# Patient Record
Sex: FEMALE | Race: WHITE | NOT HISPANIC OR LATINO | Employment: FULL TIME | URBAN - METROPOLITAN AREA
[De-identification: names, ages, dates, MRNs, and addresses within clinical notes are randomized per-mention and may not be internally consistent; named-entity substitution may affect disease eponyms.]

---

## 2020-11-19 ENCOUNTER — PATIENT OUTREACH (OUTPATIENT)
Dept: PLASTIC SURGERY | Facility: CLINIC | Age: 35
End: 2020-11-19

## 2020-11-19 NOTE — PROGRESS NOTES
[facility]:  Care Coordination Note     SITUATION   Patient (Cristian, they/them)  is a 34 year old who is receiving support for:  Consult For (top surgery ) and Clinic Care Coordination - Initial  .    BACKGROUND     New pt seeking top surgery.     Pt goes to [facility].     ASSESSMENT     Surgery              CGC Assessment  Comprehensive Gender Care ([facility]) Enrollment: Enrolled  Patient has a therapist: Yes  Name of therapist: Nick sanders [facility]  Letter of support #1: Received  Surgery being considered: Yes(5/2017 started hormones, goes to [facility])  Mastectomy: Yes    Pt reports he does not smoke or use nicotine and does not have diabetes.         PLAN          Nursing Interventions:   [facility] program and services discussed with patient. Educational surgical packet provided and reviewed with patient. Process for accessing surgery discussed, including: WPATH standards of care, letters of support, treatment plan action steps, [place] insurance process, surgery scheduling, and approximate timeline.   Pt questions answered within scope of practice.     Follow-up plan:    1. Sign up for mychart.   2. Obtain LOS and mammogram if possible before consult.        Yaya Davalos

## 2021-02-19 NOTE — TELEPHONE ENCOUNTER
FUTURE VISIT INFORMATION      FUTURE VISIT INFORMATION:    Date: 5.18.21    Time: 11 AM    Location: [facility]  REFERRAL INFORMATION:    Referring provider:  [facility]    Referring providers clinic:  [facility]    Reason for visit/diagnosis  New top    RECORDS REQUESTED FROM:

## 2021-05-06 ENCOUNTER — TRANSFERRED RECORDS (OUTPATIENT)
Dept: HEALTH INFORMATION MANAGEMENT | Facility: CLINIC | Age: 36
End: 2021-05-06

## 2021-05-10 ENCOUNTER — PATIENT OUTREACH (OUTPATIENT)
Dept: PLASTIC SURGERY | Facility: CLINIC | Age: 36
End: 2021-05-10

## 2021-05-10 NOTE — PROGRESS NOTES
[facility]:  Care Coordination Note     SITUATION   Patient is a 35 year old who is receiving support for:  Clinic Care Coordination - Follow-up (received top surgery letter)  .    BACKGROUND     Pt provided adequate top surgery letter prior to consultation date on 5/18/21 with DR. Willis.     ASSESSMENT     Surgery              CGC Assessment  Comprehensive Gender Care ([facility]) Enrollment: Enrolled  Patient has a therapist: Yes  Name of therapist: Nick Bean  Letter of support #1: Received  Letter #1 Date: 05/06/21  Surgery being considered: Yes  Mastectomy: Yes          PLAN          Nursing Interventions:   Reviewed letter of support for WPATH standards of care which is adequate.     Follow-up plan:  Pt to attend consult.        Yaya Davalos

## 2021-05-18 ENCOUNTER — OFFICE VISIT (OUTPATIENT)
Dept: PLASTIC SURGERY | Facility: CLINIC | Age: 36
End: 2021-05-18
Payer: COMMERCIAL

## 2021-05-18 ENCOUNTER — PRE VISIT (OUTPATIENT)
Dept: PLASTIC SURGERY | Facility: CLINIC | Age: 36
End: 2021-05-18

## 2021-05-18 VITALS — BODY MASS INDEX: 21.26 KG/M2 | WEIGHT: 120 LBS | HEIGHT: 63 IN

## 2021-05-18 DIAGNOSIS — F64.0 GENDER DYSPHORIA IN ADOLESCENT AND ADULT: Primary | ICD-10-CM

## 2021-05-18 PROCEDURE — 99204 OFFICE O/P NEW MOD 45 MIN: CPT | Performed by: SURGERY

## 2021-05-18 RX ORDER — CEFAZOLIN SODIUM 2 G/50ML
2 SOLUTION INTRAVENOUS
Status: CANCELLED | OUTPATIENT
Start: 2021-05-18

## 2021-05-18 RX ORDER — TESTOSTERONE CYPIONATE 1000 MG/10ML
60 INJECTION, SOLUTION INTRAMUSCULAR WEEKLY
COMMUNITY

## 2021-05-18 RX ORDER — HYDROXYZINE HYDROCHLORIDE 25 MG/1
25 TABLET, FILM COATED ORAL
COMMUNITY
Start: 2021-04-16

## 2021-05-18 RX ORDER — CEFAZOLIN SODIUM 2 G/50ML
2 SOLUTION INTRAVENOUS SEE ADMIN INSTRUCTIONS
Status: CANCELLED | OUTPATIENT
Start: 2021-05-18

## 2021-05-18 RX ORDER — FLUOXETINE 10 MG/1
10 CAPSULE ORAL
COMMUNITY
Start: 2021-04-16

## 2021-05-18 ASSESSMENT — PAIN SCALES - GENERAL: PAINLEVEL: NO PAIN (0)

## 2021-05-18 ASSESSMENT — MIFFLIN-ST. JEOR: SCORE: 1218.45

## 2021-05-18 NOTE — PROGRESS NOTES
"PLASTICS NEW [facility]   HPI: This is a 35 year old biological female who identifies as nonbinary/ \"masc of [place]\" with a history of gender dysphoria who presents today by themselves for a consultation for top surgery. Their pronouns are they/them and their preferred name is Cristian. They were referred by a friend who works at [employer] and made their appointment 9 months ago. Their therapist is Radha Bean (Zeb) and this therapist has already written the patient a letter of support. Patient has been seeing this therapist for the past 4 years. They have been on testosterone for 4 years, administered by [employer]. They have been living their chosen gender identity/role for 5-6 years. They do not usually bind but when they do they use a sports bra. No breast lumps, skin puckering, nipple drainage, or other breast problems. Patient had a cyst in their left breast in 2018 - benign. Resolved with antibiotics. He has had a mammogram in preparation for this appointment (Negative, 4/23/21). Left nipple used to be pierced - occasional discharge.     Medical Hx: Gender dysphoria. No history of asthma, diabetes mellitus, or GERD. No bleeding, clotting, healing or scarring problems.    Food sensitivities - undiagnosed.     Anxiety.     Depression - takes Prozac (prescribed by PCP Dr. Wagner) also does talk therapy.     Chronic pain - upper extremities, attributed to over use from work.     PTSD.     Surgical Hx:     Appendectomy - age 15. No complications with anesthesia. Experienced nausea from pain medication, possibly opioid sensitivity.     3rd molar extraction.    Family Hx: No known family history of ovarian cancer.  Unknown - some estrangement.  Mother had possible minimal breast CA? With minimal treatment.   Paternal side - hypertension.   MGF had MI - heavy smoker.   Father had prostate CA.     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  Relationship status/family: " "Single. Lives with dog. Lives alone. Friends will be able to take care of patient postop. Patient has experience taking care of others post top surgery. Smoking status: No nicotine. Occasional marijuana use. Alcohol use: Under 1 drink Qweek. Diet: Omnivore. No restrictions. Balanced diet. Caffeine: Rare coffee. Exercise: Occasionally goes for runs. Walks at least 1 hour Qday. Walks dog. Rides bike. Sleep: Averages 8 hours Qnight.     PE: General: Height: 5' 3\" Weight: 120 lbs   Chest:   Nice upper chest contour.  Tattoo below R lateral breast.  Small tattoo left anterior chest/shoulder.  Well-developed pec muscles.    Grade 1 nipple ptosis.   R breast more ptotic.  Chest hair present - possible need for post-op electrolysis for residual periareolar hair.   Areolae are about 2cm in diameter - prominent nipples.  R breast is slightly larger than the L. Both breasts about 100-150g.    IMFs situated about 2-3 cms below the pec muscle.   R IMF situated about 1cm lower than the left.   No lateral thoracic rolls or anterior axillary folds.   Fibrous breast tissue.   No lymphadenopathy or masses.   Photos taken with consent.     Patient initially wanted to maintain nipple sensation. However, we discussed the limitation to arielle-areolar/keyhole technique due to their low nipple placement. We talked through the best technique for the patient- trying to maintain blood flow yet also creating a flat appearance and proper nipple placement. Cristian will continue to reflect upon this but feels that they are comfortable with double incision technique with nipple grafts if necessary.    A&P: 35 year old biological female who identifies as nonbinary who is a good candidate for gender affirming top surgery with one of the following: bilateral simple mastectomy vs. breast reduction, +/- possible nipple graft reconstruction. They will most likely need a bilateral simple mastectomy with nipple graft reconstruction depending on intraoperative " findings and the patient's desired outcome. The patient is interested in nipple grafts. The patient has already had a pre-op mammogram. They will need an H&P from their PCP.     They did not meet with our Transgender Coordinator but they will be in contact via [employer] to discuss communications with staff and timeline. Any further discussion of risks and complications will be reviewed during the pre-op visit.    Because of patient's job, they would have to take at least 4-6 weeks off postop. They would like to schedule surgery some time in December while they are not working.     Patient accepts the risks of this procedure and would like to proceed with surgery.  Since we have already received their therapist letter of support we will initiate the prior authorization process.     According to [place] Case Law and [facility] standards of care, with an appropriate letter of support from a mental health provider, top surgery/mastectomy is medically necessary for the treatment of gender dysphoria.     Total time = 45 minutes, spent on the date of encounter doing chart review, history and physical, dressing changes, documentation, patient education, and any further activity as noted above.     This note was prepared on behalf of Kim Willis MD by Kimberly Watkins, a trained medical scribe, based on my observations and the provider's statements to me.

## 2021-05-18 NOTE — LETTER
"5/18/2021       RE: Yudelka Salas  [address] [address]     Dear Colleague,    Thank you for referring your patient, Yudelka Salas, to the [facility] PLASTIC AND RECONSTRUCTIVE SURGERY CLINIC [place] at [facility] [place]. Please see a copy of my visit note below.    PLASTICS NEW TOP   HPI: This is a 35 year old biological female who identifies as nonbinary/ \"masc of [place]\" with a history of gender dysphoria who presents today by themselves for a consultation for top surgery. Their pronouns are they/them and their preferred name is Cristian. They were referred by a friend who works at [employer] and made their appointment 9 months ago. Their therapist is Radha Bean ([facility]) and this therapist has already written the patient a letter of support. Patient has been seeing this therapist for the past 4 years. They have been on testosterone for 4 years, administered by [employer]. They have been living their chosen gender identity/role for 5-6 years. They do not usually bind but when they do they use a sports bra. No breast lumps, skin puckering, nipple drainage, or other breast problems. Patient had a cyst in their left breast in 2018 - benign. Resolved with antibiotics. He has had a mammogram in preparation for this appointment (Negative, 4/23/21). Left nipple used to be pierced - occasional discharge.     Medical Hx: Gender dysphoria. No history of asthma, diabetes mellitus, or GERD. No bleeding, clotting, healing or scarring problems.    Food sensitivities - undiagnosed.     Anxiety.     Depression - takes Prozac (prescribed by PCP Dr. Wagner) also does talk therapy.     Chronic pain - upper extremities, attributed to over use from work.     PTSD.     Surgical Hx:     Appendectomy - age 15. No complications with anesthesia. Experienced nausea from pain medication, possibly opioid sensitivity.     3rd molar extraction.    Family Hx: " "No known family history of ovarian cancer.  Unknown - some estrangement.  Mother had possible minimal breast CA? With minimal treatment.   Paternal side - hypertension.   MGF had MI - heavy smoker.   Father had prostate CA.     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  Relationship status/family: Single. Lives with dog. Lives alone. Friends will be able to take care of patient postop. Patient has experience taking care of others post top surgery. Smoking status: No nicotine. Occasional marijuana use. Alcohol use: Under 1 drink Qweek. Diet: Omnivore. No restrictions. Balanced diet. Caffeine: Rare coffee. Exercise: Occasionally goes for runs. Walks at least 1 hour Qday. Walks dog. Rides bike. Sleep: Averages 8 hours Qnight.     PE: General: Height: 5' 3\" Weight: 120 lbs   Chest:   Nice upper chest contour.  Tattoo below R lateral breast.  Small tattoo left anterior chest/shoulder.  Well-developed pec muscles.    Grade 1 nipple ptosis.   R breast more ptotic.  Chest hair present - possible need for post-op electrolysis for residual periareolar hair.   Areolae are about 2cm in diameter - prominent nipples.  R breast is slightly larger than the L. Both breasts about 100-150g.    IMFs situated about 2-3 cms below the pec muscle.   R IMF situated about 1cm lower than the left.   No lateral thoracic rolls or anterior axillary folds.   Fibrous breast tissue.   No lymphadenopathy or masses.   Photos taken with consent.     Patient initially wanted to maintain nipple sensation. However, we discussed the limitation to arielle-areolar/keyhole technique due to their low nipple placement. We talked through the best technique for the patient- trying to maintain blood flow yet also creating a flat appearance and proper nipple placement. Cristian will continue to reflect upon this but feels that they are comfortable with double incision technique with nipple grafts if necessary.    A&P: 35 year " old biological female who identifies as nonbinary who is a good candidate for gender affirming top surgery with one of the following: bilateral simple mastectomy vs. breast reduction, +/- possible nipple graft reconstruction. They will most likely need a bilateral simple mastectomy with nipple graft reconstruction depending on intraoperative findings and the patient's desired outcome. The patient is interested in nipple grafts. The patient has already had a pre-op mammogram. They will need an H&P from their PCP.     They did not meet with our Transgender Coordinator but they will be in contact via [employer] to discuss communications with staff and timeline. Any further discussion of risks and complications will be reviewed during the pre-op visit.    Because of patient's job, they would have to take at least 4-6 weeks off postop. They would like to schedule surgery some time in December while they are not working.     Patient accepts the risks of this procedure and would like to proceed with surgery.  Since we have already received their therapist letter of support we will initiate the prior authorization process.     According to [place] Case Law and [facility] standards of care, with an appropriate letter of support from a mental health provider, top surgery/mastectomy is medically necessary for the treatment of gender dysphoria.     Total time = 45 minutes, spent on the date of encounter doing chart review, history and physical, dressing changes, documentation, patient education, and any further activity as noted above.     This note was prepared on behalf of Kim Willis MD by Kimberly Watkins, a trained medical scribe, based on my observations and the provider's statements to me.         Again, thank you for allowing me to participate in the care of your patient.      Sincerely,    Kim Wlilis MD

## 2021-05-18 NOTE — LETTER
Date:July 29, 2021      Patient was self referred, no letter generated. Do not send.        [facility] Health Information

## 2021-05-18 NOTE — NURSING NOTE
"Chief Complaint   Patient presents with     Consult     Pt here for top surgery consult       Vitals:    05/18/21 1056   Weight: 54.4 kg (120 lb)   Height: 1.6 m (5' 3\")       Body mass index is 21.26 kg/m .      NELDA Mora NREMT                    No vitals taken per provier  "

## 2021-06-04 ENCOUNTER — TELEPHONE (OUTPATIENT)
Dept: SURGERY | Facility: CLINIC | Age: 36
End: 2021-06-04

## 2021-06-04 PROBLEM — F64.0 GENDER DYSPHORIA IN ADOLESCENT AND ADULT: Status: ACTIVE | Noted: 2021-06-04

## 2021-06-04 NOTE — TELEPHONE ENCOUNTER
I contacted the patient via phone and spoke with them to confirm the scheduled dates and provide the following information:     Surgeon/surgery date/location:  Dr. Willis on 12/1 at [place].  Arrival:   11:00 AM   Pre-op consult:   Dr. Willis on 11/9.   Pre-op physical with:   PCP. Patient is aware this needs to be completed within 30 days of surgery.  COVID-19 test:   11/29. Central scheduling to schedule.  Post-op:   12/7, 1/11/22.    The surgery packet was provided via letter in the mail per patient request.

## 2021-06-12 ENCOUNTER — HEALTH MAINTENANCE LETTER (OUTPATIENT)
Age: 36
End: 2021-06-12

## 2021-06-20 DIAGNOSIS — Z11.59 ENCOUNTER FOR SCREENING FOR OTHER VIRAL DISEASES: ICD-10-CM

## 2021-10-02 ENCOUNTER — HEALTH MAINTENANCE LETTER (OUTPATIENT)
Age: 36
End: 2021-10-02

## 2021-11-05 ENCOUNTER — TELEPHONE (OUTPATIENT)
Dept: SURGERY | Facility: CLINIC | Age: 36
End: 2021-11-05

## 2021-11-05 NOTE — TELEPHONE ENCOUNTER
[facility] Call Center    Phone Message    May a detailed message be left on voicemail: yes     Reason for Call: Other: Patient is calling in asking for a call back. They state that they would like to cancel and reschedule their upcoming surgery with Dr. Willis on 12/1. Please call back as soon as possible to discuss.     Action Taken: Message routed to:  [facility] Surg    Travel Screening: Not Applicable

## 2021-11-05 NOTE — TELEPHONE ENCOUNTER
Writer called pt to check about rescheduling surgery, no answer, LVM. Explained that pt would likely not be rescheduled until 3/2022. Writer to wait for a callback.     Aleisha Gary

## 2021-11-10 ENCOUNTER — TELEPHONE (OUTPATIENT)
Dept: SURGERY | Facility: CLINIC | Age: 36
End: 2021-11-10
Payer: COMMERCIAL

## 2021-11-10 NOTE — TELEPHONE ENCOUNTER
Patient wants to reschedule to June 2022. Called patient and left a voicemail to reschedule surgery with Dr. Willis from 12/1 to June. Pulled surgery from OR board and placed in depot. Cancelled appointments. Provided call back number to patient.

## 2022-05-04 ENCOUNTER — PATIENT OUTREACH (OUTPATIENT)
Dept: PLASTIC SURGERY | Facility: CLINIC | Age: 37
End: 2022-05-04
Payer: COMMERCIAL

## 2022-05-04 NOTE — PATIENT INSTRUCTIONS
Spoke with pt today to review options available to get questions answered about upcoming top surgery. After discussing pt questions with Dr Willis yesterday, I offered pt to come in and see her on 6/21 but pt was unavailable. Appt made for 6/28 to review option of Melanie approach vs DI approach. All other appts will remain the same in preparation for surgery on 7/13. Pt has many questions and I encouraged them to write them down in order to prepare for the visit. Pt is in agreement and grateful for the call.  Julian COLLIER RN

## 2022-06-28 ENCOUNTER — OFFICE VISIT (OUTPATIENT)
Dept: PLASTIC SURGERY | Facility: CLINIC | Age: 37
End: 2022-06-28
Payer: COMMERCIAL

## 2022-06-28 VITALS
DIASTOLIC BLOOD PRESSURE: 77 MMHG | HEART RATE: 71 BPM | HEIGHT: 63 IN | TEMPERATURE: 98.6 F | WEIGHT: 127 LBS | SYSTOLIC BLOOD PRESSURE: 114 MMHG | BODY MASS INDEX: 22.5 KG/M2 | OXYGEN SATURATION: 97 %

## 2022-06-28 DIAGNOSIS — F64.0 GENDER DYSPHORIA IN ADOLESCENT AND ADULT: Primary | ICD-10-CM

## 2022-06-28 PROCEDURE — 99214 OFFICE O/P EST MOD 30 MIN: CPT | Performed by: SURGERY

## 2022-06-28 ASSESSMENT — PAIN SCALES - GENERAL: PAINLEVEL: NO PAIN (0)

## 2022-06-28 NOTE — LETTER
Date:July 5, 2022      Provider requested that no letter be sent. Do not send.       [facility]

## 2022-06-28 NOTE — LETTER
6/28/2022       RE: Yudelka Salas  [address]  [place] [address]     Dear Colleague,    Thank you for referring your patient, Yudelka Salas, to the [facility] PLASTIC AND RECONSTRUCTIVE SURGERY CLINIC [place] at [facility]. Please see a copy of my visit note below.    PLASTICS PRE-OP  This is a 36 year old biological female who identifies as non-binary who presents for their pre-op visit prior to bilateral simple mastectomy with possible nipple graft reconstruction scheduled for 7/13/2022. They are here today by themself. The patient did not need a mammogram, and their letter of support from Nick Ahn has been received. History and physical were received. COVID test is scheduled for 7/09/2022.     Our LPN, Maria , discussed periop instructions with the patient including: not eating anything 8 hours prior to surgery, drinking clear liquids up to 2 hours before surgery except for morning medications with a sip of water, the preop shower with surgical soap which was given, and wearing a button- or zip-up shirt on the day of surgery. She also instructed the patient to avoid NSAIDs x 1 week both before AND after surgery, but they may take Tylenol post-op for pain as needed. She also gave the patient a folder with information on arielle-op topics, including where the surgery will be.     I discussed the following with the patient; preop, intraop and postop phases of care on the day of surgery, the placement of a bladder catheter during surgery that will likely be removed in recovery, postop cares and limitations with relation to home and work settings, 5-lb weight restriction for the first 3 weeks postop, and how long to maintain limited activities. We also discussed Zofran, oxycodone, Z-iris, and antipruritics which will be prescribed. We discussed that preventing constipation will be their responsibility, and we discussed methods such as  aloe, prune juice or Miralax.     In addition, we went over the possible risks and complications involved with this elective procedure. These include but are not limited to: infection, bleeding, hematoma/seroma formation, and poor healing (including dehiscence or hypertrophic scarring). We also discussed the possibility of altered chest sensation (either hypo or hypersensitive), residual deformities and asymmetries, possible further surgical revisions, and possible injury to surrounding neurovascular and musculoskeletal structures, including intra-axillary or intra-thoracic. We lastly discussed anesthetic risks including DVT/PE or cardiopulmonary events.    PE: Patient was visually examined to determine location of pec muscle in relation to nipples. Nipples are located just below the pec muscle. We discussed the asymmetry of their nipples. Patient is not concerned about the asymmetry of their nipples.     I informed that a periareolar incision was possible, but difficult due to location of nipples and where incisions would be made.     A/P: Cristian is interested in having a flat chest to draw less attention while swimming. They are also concerned about the attention they may receive regarding scarring in the area. Their main concern is to have less perceivable breast tissue when they wear t-shirts. They are also hoping to retain nipple sensation.     After thoroughly discussing the difference between a double incision vs. a periareolar incision, the patient decided on going with a periareolar incision. The patient was informed the risks and complications between the two surgical methods. Patient was also informed about possible nipple necrosis with the periareolar approach following their surgery. Patient had numerous questions regarding nipple attachment and statistics regarding nipple rejection.  Cristian is willing to take the chance of having their nipple placement end up at or lower than their inferior pectoralis  "margin if their skin does not \"shrink wrap\" normally after periareolar approach. They understand that it may be more difficult if they then want to have nipple grafts later due to lack of redundant skin. They also understand that nipple sensation may not be normal after periareolar approach, and that healing may look asymmetrical or not completely flat.     Total time = 30 minutes, spent on the date of encounter doing chart review, history and physical, dressing changes, documentation, patient education, and any further activity as noted above.     This note was prepared on behalf of Kim Willis MD by Elda Maldonado, a trained medical scribe, based on my observations and the provider's statements to me.                     Again, thank you for allowing me to participate in the care of your patient.      Sincerely,    Kim Willis MD      "

## 2022-06-28 NOTE — PATIENT INSTRUCTIONS
Bilateral Mastectomy   Pre and Post op Surgery Instructions    You are scheduled for top surgery with nipple grafts on 7/13/22 at 1:10 PM    You will need to arrive at 11:10 [place] [address]. You can park in the Green [address] and check in on 3rd floor. (See attached map).     - Please make sure you have someone to drive you home after your surgery and you will need someone to stay with you at home 24 hours after your surgery.    The surgery will be about 3-4 hours long. You will be in recovery afterwards for about 1-2 hours.     Before Surgery:  - 8 hours prior to surgery stop eating solid food. You can continue to drink clear liquids (water, apple juice, Gatorade) up to 2 hours before surgery. If you have any medications that need to be taken in this timeframe, you can take them with a small sip of water    - No Ibuprofen, Advil, Aleve, Naproxen, Motrin, Aspirin for 7 days prior to surgery. If you are having pain in the week before surgery Tylenol is okay to take.    - Wear or bring a button up or zip up shirt with you to the hospital.    - Wash with surgical soap:    Showering with an antiseptic soap prior to an invasive procedure will decrease the  bacteria that is normally found on the skin.   Using 1/2 of the bottle for each application.  Be sure to use the whole bottle before coming to surgery.  The evening before surgery, shower or bathe as you normally would, using your preferred soap.  Give special attention to areas where the incisions will be made. Rinse thoroughly.  You may wash your hair with your regular shampoo.   Next wash your entire body, from the chin down, with the special antiseptic soap (full strength) using a freshly laundered clean washcloth, again giving special attention to areas where incisions will be made.  Rinse thoroughly and dry off using a freshly laundered clean towel.   Wear clean clothes/pajamas and sleep  on clean sheets  Repeat steps 2 and 3 in the morning before coming to surgery (using the rest of the bottle of soap).     **If you have a reaction to the surgical soap, let the nurse know.     Events of day:     -Check in on the 3rd floor of the hospital for your surgery.    Pre-op     - After you check in, you will meet with a pre-op nurse. They will go over your medications, review your H&P (pre-op physical), have you change into a paper gown, and your chest will be wiped again with soap.    - They will place compression boots on both legs to help decrease risk of blood clots.     - You may be asked to complete a pregnancy test. If you have had no exposure to sperm, you can decline.    - You will meet with the anesthesiologists and nurse anesthetist who will be keeping you asleep during surgery, they will be placing an IV, and will be monitoring you throughout the surgery.    - You will meet with the RN as you are being moved into operating room, they will be helping to position you and keep you comfortable during the surgery.     - Dr. Willis will meet you in the pre-op area to discuss any questions about surgery, make markings on your chest for planning, and to sign your consent.     Intra-op (OR)    - A catheter will be placed in your bladder after you are sleeping and is typically removed before you wake up. The longest this would typically be in is in the post-op recovery room if needed.     - There will be straps and pillows to help position you and keep you in place during the surgery.    - The tissue that is removed will be sent to pathology to be analyzed and then discarded.     - KISHORE drains will be placed (one in each armpit) and a pain catheter will be placed in the center of your chest.     - Closure is done with dissolvable sutures under the skin and is then sealed with glue, and tape.    Post-op/Recovery    - You can usually go home the same day so long as you are eating, drinking, voiding, and pain  "is well controlled.  You will be sent home with all needed supplies.     - Post-Op medications you will be given in addition to the anesthesia include: Zofran (nausea), Oxycodone (pain), Z-iris (infection), and antipruritic (itching).     - You will leave the hospitals with an ace bandage wrapped around your chest for compression. You may keep the ace bandage on until you come back for your one week post op visit or you may adjust the wrap as needed if it is either too tight or too loose.     Post-Op Cares:     - No lifting over 5 lbs for 3 weeks after surgery    - No stretching arms out or above the head (\"modified T-diego\")    - Walk around frequently to help prevent blood clots    - Do deep breathing exercises to prevent pneumonia    - No sleeping on stomach x 3 weeks after surgery, if it is difficult not to roll over onto your stomach you can sleep in a recliner or use additional pillows    - Do not use any ice packs or heat packs    - Preventing constipation will be your responsibility. You can use whatever method works best for you such as; aloe, prune juice, Sennokot or Miralax.    No showering in the first week after your surgery.     What to expect at your 1st post op visit:    When you come in for your 1st post op visit, we will assess the output of your drains and remove the pain catheter (On-Q) that was placed on your chest.     -Please remember to bring the documentations of your output with you to your appointment so we can review how much your drains have been putting out since your surgery.     -We will remove the bolsters that was placed on your nipples and teach you how to perform nipple graft dressings.     -You will be given supplies to take home and will need to continue wearing the ace wrap compression for another week after the drains are removed.       Nipple Care:   Change nipple dressings daily.  Take dressings off prior to showering and reapply after showering.  No direct shower spray on " nipple grafts for 4 weeks.     Cut vaseline gauze to nipple size and place over nipple.  Place folded white gauze over vaseline gauze and cover with plastic dressing.  Do dressing changes for 1 more week.  If you continue to have drainage, continue the dressings until drainage stops.       Drain Care:  You will be discharged with Germán-Conteh (KISHORE) drainage tubes.  These tubes drain fluid from your incision, helping to prevent swelling and reducing the risk for infection.  The tube is held in place by a few stitches. Pin your KISHORE drain to your clothing by using a safety pin through the plastic loop on the top of the bulb. If the drain is not attached to your clothing, it may pull out from under your skin. Drains are uncomfortable!    Emptying the drain bulb: The measuring cup provided by the hospital or a measuring cup that is used only for the KISHORE drain.  Wash your hands with soap and water.  Hold the bulb securely.  Remove the drainage plug from the emptying port.  Carefully turn the bulb upside down over the measuring cup, and gently squeeze all of the drainage into the measuring cup.  Squeeze the middle of the bulb firmly so that the bulb is as flat as possible.                                                                                                                                                    While still squeezing the bulb, replace the drainage plug. This step is important to keep the drain suction  Measure how much fluid you removed from the bulb.  Write down the amount and color of the fluid you removed from the bulb. If  you have more than one drain, keep a separate record for each one.  Empty the fluid into the toilet and flush.  Rinse the measuring cup, and wash your hands with soap and water.  You should empty the drain at least two times each day, in the morning and at bedtime.  Drainage tubes are removed when the output is less than 20ml in a 24 hour period for 2 consecutive days.  Output  will be somewhere between 30 to 50 mLs per day, but don't be alarmed if it is more or less. Output may not be equal between sides. Amounts will probably decrease over time.  The color coming from the drains may vary from deep red, light pink, or clear yellow.    Stripping the tube:  To prevent clots from blocking the drain, you will need to  strip  it. Stripping means that you use your fingers to squeeze along the length of the drain to help maintain the flow of drainage.  Wash your hands.  Using one hand, firmly hold the tubing near the insertion site (as close to your skin as the ace wrap and gauze dressing will allow). This will prevent the drain from being pulled out while you are stripping it and from pulling on skin and sutures.  [place] upper/top fingers and milk with the bottom or lower fingers.  Using your index finger and thumb of the other hand, squeeze the tubing below the  first hand. You should squeeze it firmly enough so the tubing becomes flat.   Maintain pressure on the tube, as you are squeezing, slide your index finger and thumb down the tube about 4-6 inches toward the bulb. (use alcohol wipes or lotion to help).  Then, release the hand closest to where the tube is attached to the body (making sure to keep pressure with the other hand), and move upper/anchor hand down. Squeeze, Repeat in segments.  Do not release the pressure you are creating in the tubing until you reach the bulb.  The whole tube will be flat.   If at any time it feels like the tube is pulling away from the skin or starts to hurt STOP! Then start over again more gently.   Strip the drain each time you empty it.

## 2022-06-28 NOTE — PROGRESS NOTES
PLASTICS PRE-OP  This is a 36 year old biological female who identifies as non-binary who presents for their pre-op visit prior to bilateral simple mastectomy with possible nipple graft reconstruction scheduled for 7/13/2022. They are here today by themself. The patient did not need a mammogram, and their letter of support from [employer] has been received. History and physical were received. COVID test is scheduled for 7/09/2022.     Our LPN, Maria , discussed periop instructions with the patient including: not eating anything 8 hours prior to surgery, drinking clear liquids up to 2 hours before surgery except for morning medications with a sip of water, the preop shower with surgical soap which was given, and wearing a button- or zip-up shirt on the day of surgery. She also instructed the patient to avoid NSAIDs x 1 week both before AND after surgery, but they may take Tylenol post-op for pain as needed. She also gave the patient a folder with information on arielle-op topics, including where the surgery will be.     I discussed the following with the patient; preop, intraop and postop phases of care on the day of surgery, the placement of a bladder catheter during surgery that will likely be removed in recovery, postop cares and limitations with relation to home and work settings, 5-lb weight restriction for the first 3 weeks postop, and how long to maintain limited activities. We also discussed Zofran, oxycodone, Z-iris, and antipruritics which will be prescribed. We discussed that preventing constipation will be their responsibility, and we discussed methods such as aloe, prune juice or Miralax.     In addition, we went over the possible risks and complications involved with this elective procedure. These include but are not limited to: infection, bleeding, hematoma/seroma formation, and poor healing (including dehiscence or hypertrophic scarring). We also discussed the possibility of altered chest sensation  "(either hypo or hypersensitive), residual deformities and asymmetries, possible further surgical revisions, and possible injury to surrounding neurovascular and musculoskeletal structures, including intra-axillary or intra-thoracic. We lastly discussed anesthetic risks including DVT/PE or cardiopulmonary events.    PE: Patient was visually examined to determine location of pec muscle in relation to nipples. Nipples are located just below the pec muscle. We discussed the asymmetry of their nipples. Patient is not concerned about the asymmetry of their nipples.     I informed that a periareolar incision was possible, but difficult due to location of nipples and where incisions would be made.     A/P: Cristian is interested in having a flat chest to draw less attention while swimming. They are also concerned about the attention they may receive regarding scarring in the area. Their main concern is to have less perceivable breast tissue when they wear t-shirts. They are also hoping to retain nipple sensation.     After thoroughly discussing the difference between a double incision vs. a periareolar incision, the patient decided on going with a periareolar incision. The patient was informed the risks and complications between the two surgical methods. Patient was also informed about possible nipple necrosis with the periareolar approach following their surgery. Patient had numerous questions regarding nipple attachment and statistics regarding nipple rejection.  Cristian is willing to take the chance of having their nipple placement end up at or lower than their inferior pectoralis margin if their skin does not \"shrink wrap\" normally after periareolar approach. They understand that it may be more difficult if they then want to have nipple grafts later due to lack of redundant skin. They also understand that nipple sensation may not be normal after periareolar approach, and that healing may look asymmetrical or not completely " flat.     Total time = 30 minutes, spent on the date of encounter doing chart review, history and physical, dressing changes, documentation, patient education, and any further activity as noted above.     This note was prepared on behalf of Kim Willis MD by Elda Maldonado, a trained medical scribe, based on my observations and the provider's statements to me.

## 2022-06-28 NOTE — NURSING NOTE
"Chief Complaint   Patient presents with     RECHECK     Cristian, is being seen today for a follow up discussion regarding surgical, DI vs Melanie.       Vitals:    06/28/22 1013   BP: 114/77   BP Location: Left arm   Patient Position: Chair   Cuff Size: Adult Regular   Pulse: 71   Temp: 98.6  F (37  C)   TempSrc: Oral   SpO2: 97%   Weight: 57.6 kg (127 lb)   Height: 1.6 m (5' 3\")       Body mass index is 22.5 kg/m .      Maria Solomon LPN    "

## 2022-06-29 ENCOUNTER — TELEPHONE (OUTPATIENT)
Dept: PLASTIC SURGERY | Facility: CLINIC | Age: 37
End: 2022-06-29

## 2022-07-09 ENCOUNTER — HEALTH MAINTENANCE LETTER (OUTPATIENT)
Age: 37
End: 2022-07-09

## 2022-07-09 ENCOUNTER — LAB (OUTPATIENT)
Dept: LAB | Facility: CLINIC | Age: 37
End: 2022-07-09
Attending: SURGERY

## 2022-07-09 DIAGNOSIS — Z20.822 ENCOUNTER FOR LABORATORY TESTING FOR COVID-19 VIRUS: ICD-10-CM

## 2022-07-09 LAB — SARS-COV-2 RNA RESP QL NAA+PROBE: NEGATIVE

## 2022-07-09 PROCEDURE — U0003 INFECTIOUS AGENT DETECTION BY NUCLEIC ACID (DNA OR RNA); SEVERE ACUTE RESPIRATORY SYNDROME CORONAVIRUS 2 (SARS-COV-2) (CORONAVIRUS DISEASE [COVID-19]), AMPLIFIED PROBE TECHNIQUE, MAKING USE OF HIGH THROUGHPUT TECHNOLOGIES AS DESCRIBED BY CMS-2020-01-R: HCPCS | Performed by: FAMILY MEDICINE

## 2022-07-13 ENCOUNTER — ANESTHESIA (OUTPATIENT)
Dept: SURGERY | Facility: CLINIC | Age: 37
End: 2022-07-13
Payer: COMMERCIAL

## 2022-07-13 ENCOUNTER — ANESTHESIA EVENT (OUTPATIENT)
Dept: SURGERY | Facility: CLINIC | Age: 37
End: 2022-07-13
Payer: COMMERCIAL

## 2022-07-13 ENCOUNTER — HOSPITAL ENCOUNTER (OUTPATIENT)
Facility: CLINIC | Age: 37
Discharge: HOME | End: 2022-07-13
Attending: SURGERY | Admitting: SURGERY
Payer: COMMERCIAL

## 2022-07-13 VITALS
OXYGEN SATURATION: 96 % | TEMPERATURE: 97.7 F | BODY MASS INDEX: 21.64 KG/M2 | DIASTOLIC BLOOD PRESSURE: 97 MMHG | HEIGHT: 63 IN | WEIGHT: 122.14 LBS | HEART RATE: 109 BPM | SYSTOLIC BLOOD PRESSURE: 140 MMHG | RESPIRATION RATE: 18 BRPM

## 2022-07-13 DIAGNOSIS — F64.0 GENDER DYSPHORIA IN ADOLESCENT AND ADULT: ICD-10-CM

## 2022-07-13 LAB — GLUCOSE BLDC GLUCOMTR-MCNC: 97 MG/DL (ref 70–99)

## 2022-07-13 PROCEDURE — 250N000013 HC RX MED GY IP 250 OP 250 PS 637: Performed by: ANESTHESIOLOGY

## 2022-07-13 PROCEDURE — 258N000003 HC RX IP 258 OP 636

## 2022-07-13 PROCEDURE — 250N000009 HC RX 250

## 2022-07-13 PROCEDURE — 250N000025 HC SEVOFLURANE, PER MIN: Performed by: SURGERY

## 2022-07-13 PROCEDURE — 19303 MAST SIMPLE COMPLETE: CPT | Mod: 50 | Performed by: SURGERY

## 2022-07-13 PROCEDURE — 370N000017 HC ANESTHESIA TECHNICAL FEE, PER MIN: Performed by: SURGERY

## 2022-07-13 PROCEDURE — 250N000011 HC RX IP 250 OP 636: Performed by: ANESTHESIOLOGY

## 2022-07-13 PROCEDURE — 88305 TISSUE EXAM BY PATHOLOGIST: CPT | Mod: TC | Performed by: SURGERY

## 2022-07-13 PROCEDURE — 999N000141 HC STATISTIC PRE-PROCEDURE NURSING ASSESSMENT: Performed by: SURGERY

## 2022-07-13 PROCEDURE — 250N000011 HC RX IP 250 OP 636

## 2022-07-13 PROCEDURE — 710N000012 HC RECOVERY PHASE 2, PER MINUTE: Performed by: SURGERY

## 2022-07-13 PROCEDURE — 82962 GLUCOSE BLOOD TEST: CPT

## 2022-07-13 PROCEDURE — 710N000010 HC RECOVERY PHASE 1, LEVEL 2, PER MIN: Performed by: SURGERY

## 2022-07-13 PROCEDURE — 250N000011 HC RX IP 250 OP 636: Performed by: SURGERY

## 2022-07-13 PROCEDURE — 272N000001 HC OR GENERAL SUPPLY STERILE: Performed by: SURGERY

## 2022-07-13 PROCEDURE — 272N000002 HC OR SUPPLY OTHER OPNP: Performed by: SURGERY

## 2022-07-13 PROCEDURE — 250N000009 HC RX 250: Performed by: SURGERY

## 2022-07-13 PROCEDURE — 271N000002 HC RX 271: Performed by: SURGERY

## 2022-07-13 PROCEDURE — 360N000076 HC SURGERY LEVEL 3, PER MIN: Performed by: SURGERY

## 2022-07-13 RX ORDER — ONDANSETRON 4 MG/1
4 TABLET, ORALLY DISINTEGRATING ORAL EVERY 30 MIN PRN
Status: DISCONTINUED | OUTPATIENT
Start: 2022-07-13 | End: 2022-07-13 | Stop reason: HOSPADM

## 2022-07-13 RX ORDER — FENTANYL CITRATE 50 UG/ML
INJECTION, SOLUTION INTRAMUSCULAR; INTRAVENOUS PRN
Status: DISCONTINUED | OUTPATIENT
Start: 2022-07-13 | End: 2022-07-13

## 2022-07-13 RX ORDER — AZITHROMYCIN 250 MG/1
TABLET, FILM COATED ORAL
Qty: 6 TABLET | Refills: 0 | Status: SHIPPED | OUTPATIENT
Start: 2022-07-13 | End: 2022-07-18

## 2022-07-13 RX ORDER — DEXAMETHASONE SODIUM PHOSPHATE 4 MG/ML
INJECTION, SOLUTION INTRA-ARTICULAR; INTRALESIONAL; INTRAMUSCULAR; INTRAVENOUS; SOFT TISSUE PRN
Status: DISCONTINUED | OUTPATIENT
Start: 2022-07-13 | End: 2022-07-13

## 2022-07-13 RX ORDER — ONDANSETRON 2 MG/ML
4 INJECTION INTRAMUSCULAR; INTRAVENOUS EVERY 30 MIN PRN
Status: DISCONTINUED | OUTPATIENT
Start: 2022-07-13 | End: 2022-07-13 | Stop reason: HOSPADM

## 2022-07-13 RX ORDER — DEXMEDETOMIDINE HYDROCHLORIDE 4 UG/ML
INJECTION, SOLUTION INTRAVENOUS PRN
Status: DISCONTINUED | OUTPATIENT
Start: 2022-07-13 | End: 2022-07-13

## 2022-07-13 RX ORDER — OXYCODONE HYDROCHLORIDE 5 MG/1
5 TABLET ORAL EVERY 6 HOURS PRN
Qty: 13 TABLET | Refills: 0 | Status: SHIPPED | OUTPATIENT
Start: 2022-07-13

## 2022-07-13 RX ORDER — PROPOFOL 10 MG/ML
INJECTION, EMULSION INTRAVENOUS CONTINUOUS PRN
Status: DISCONTINUED | OUTPATIENT
Start: 2022-07-13 | End: 2022-07-13

## 2022-07-13 RX ORDER — SODIUM CHLORIDE, SODIUM LACTATE, POTASSIUM CHLORIDE, CALCIUM CHLORIDE 600; 310; 30; 20 MG/100ML; MG/100ML; MG/100ML; MG/100ML
INJECTION, SOLUTION INTRAVENOUS CONTINUOUS
Status: DISCONTINUED | OUTPATIENT
Start: 2022-07-13 | End: 2022-07-13 | Stop reason: HOSPADM

## 2022-07-13 RX ORDER — ONDANSETRON 4 MG/1
4 TABLET, ORALLY DISINTEGRATING ORAL EVERY 8 HOURS PRN
Qty: 12 TABLET | Refills: 0 | Status: SHIPPED | OUTPATIENT
Start: 2022-07-13

## 2022-07-13 RX ORDER — HYDROMORPHONE HYDROCHLORIDE 1 MG/ML
0.2 INJECTION, SOLUTION INTRAMUSCULAR; INTRAVENOUS; SUBCUTANEOUS EVERY 5 MIN PRN
Status: DISCONTINUED | OUTPATIENT
Start: 2022-07-13 | End: 2022-07-13 | Stop reason: HOSPADM

## 2022-07-13 RX ORDER — ONDANSETRON 2 MG/ML
INJECTION INTRAMUSCULAR; INTRAVENOUS PRN
Status: DISCONTINUED | OUTPATIENT
Start: 2022-07-13 | End: 2022-07-13

## 2022-07-13 RX ORDER — PROPOFOL 10 MG/ML
INJECTION, EMULSION INTRAVENOUS PRN
Status: DISCONTINUED | OUTPATIENT
Start: 2022-07-13 | End: 2022-07-13

## 2022-07-13 RX ORDER — OXYCODONE HYDROCHLORIDE 5 MG/1
5 TABLET ORAL EVERY 4 HOURS PRN
Status: DISCONTINUED | OUTPATIENT
Start: 2022-07-13 | End: 2022-07-13 | Stop reason: HOSPADM

## 2022-07-13 RX ORDER — EPHEDRINE SULFATE 50 MG/ML
INJECTION, SOLUTION INTRAMUSCULAR; INTRAVENOUS; SUBCUTANEOUS PRN
Status: DISCONTINUED | OUTPATIENT
Start: 2022-07-13 | End: 2022-07-13

## 2022-07-13 RX ORDER — SODIUM CHLORIDE, SODIUM LACTATE, POTASSIUM CHLORIDE, CALCIUM CHLORIDE 600; 310; 30; 20 MG/100ML; MG/100ML; MG/100ML; MG/100ML
INJECTION, SOLUTION INTRAVENOUS CONTINUOUS PRN
Status: DISCONTINUED | OUTPATIENT
Start: 2022-07-13 | End: 2022-07-13

## 2022-07-13 RX ORDER — FENTANYL CITRATE 50 UG/ML
25 INJECTION, SOLUTION INTRAMUSCULAR; INTRAVENOUS EVERY 5 MIN PRN
Status: DISCONTINUED | OUTPATIENT
Start: 2022-07-13 | End: 2022-07-13 | Stop reason: HOSPADM

## 2022-07-13 RX ORDER — LIDOCAINE HYDROCHLORIDE 20 MG/ML
INJECTION, SOLUTION INFILTRATION; PERINEURAL PRN
Status: DISCONTINUED | OUTPATIENT
Start: 2022-07-13 | End: 2022-07-13

## 2022-07-13 RX ORDER — CEFAZOLIN SODIUM/WATER 2 G/20 ML
2 SYRINGE (ML) INTRAVENOUS SEE ADMIN INSTRUCTIONS
Status: DISCONTINUED | OUTPATIENT
Start: 2022-07-13 | End: 2022-07-13 | Stop reason: HOSPADM

## 2022-07-13 RX ORDER — CEFAZOLIN SODIUM/WATER 2 G/20 ML
2 SYRINGE (ML) INTRAVENOUS
Status: COMPLETED | OUTPATIENT
Start: 2022-07-13 | End: 2022-07-13

## 2022-07-13 RX ORDER — ACETAMINOPHEN 325 MG/1
975 TABLET ORAL ONCE
Status: COMPLETED | OUTPATIENT
Start: 2022-07-13 | End: 2022-07-13

## 2022-07-13 RX ORDER — BUPIVACAINE HYDROCHLORIDE 2.5 MG/ML
INJECTION, SOLUTION EPIDURAL; INFILTRATION; INTRACAUDAL PRN
Status: DISCONTINUED | OUTPATIENT
Start: 2022-07-13 | End: 2022-07-13 | Stop reason: HOSPADM

## 2022-07-13 RX ADMIN — Medication 10 MG: at 15:37

## 2022-07-13 RX ADMIN — PHENYLEPHRINE HYDROCHLORIDE 100 MCG: 10 INJECTION INTRAVENOUS at 15:03

## 2022-07-13 RX ADMIN — DEXAMETHASONE SODIUM PHOSPHATE 10 MG: 4 INJECTION, SOLUTION INTRAMUSCULAR; INTRAVENOUS at 13:50

## 2022-07-13 RX ADMIN — Medication 10 MG: at 14:35

## 2022-07-13 RX ADMIN — FENTANYL CITRATE 25 MCG: 50 INJECTION, SOLUTION INTRAMUSCULAR; INTRAVENOUS at 17:58

## 2022-07-13 RX ADMIN — PHENYLEPHRINE HYDROCHLORIDE 100 MCG: 10 INJECTION INTRAVENOUS at 14:35

## 2022-07-13 RX ADMIN — Medication 5 MG: at 15:03

## 2022-07-13 RX ADMIN — FENTANYL CITRATE 50 MCG: 50 INJECTION, SOLUTION INTRAMUSCULAR; INTRAVENOUS at 14:04

## 2022-07-13 RX ADMIN — Medication 2 G: at 12:56

## 2022-07-13 RX ADMIN — DEXMEDETOMIDINE 8 MCG: 100 INJECTION, SOLUTION, CONCENTRATE INTRAVENOUS at 15:55

## 2022-07-13 RX ADMIN — ONDANSETRON 4 MG: 2 INJECTION INTRAMUSCULAR; INTRAVENOUS at 16:31

## 2022-07-13 RX ADMIN — Medication 20 MG: at 14:35

## 2022-07-13 RX ADMIN — Medication 10 MG: at 15:09

## 2022-07-13 RX ADMIN — PROPOFOL 150 MG: 10 INJECTION, EMULSION INTRAVENOUS at 12:50

## 2022-07-13 RX ADMIN — FENTANYL CITRATE 25 MCG: 50 INJECTION, SOLUTION INTRAMUSCULAR; INTRAVENOUS at 18:13

## 2022-07-13 RX ADMIN — FENTANYL CITRATE 50 MCG: 50 INJECTION, SOLUTION INTRAMUSCULAR; INTRAVENOUS at 13:32

## 2022-07-13 RX ADMIN — ONDANSETRON 4 MG: 2 INJECTION INTRAMUSCULAR; INTRAVENOUS at 17:46

## 2022-07-13 RX ADMIN — ACETAMINOPHEN 975 MG: 325 TABLET ORAL at 19:03

## 2022-07-13 RX ADMIN — FENTANYL CITRATE 100 MCG: 50 INJECTION, SOLUTION INTRAMUSCULAR; INTRAVENOUS at 12:48

## 2022-07-13 RX ADMIN — Medication 2 G: at 16:45

## 2022-07-13 RX ADMIN — SODIUM CHLORIDE, POTASSIUM CHLORIDE, SODIUM LACTATE AND CALCIUM CHLORIDE: 600; 310; 30; 20 INJECTION, SOLUTION INTRAVENOUS at 14:15

## 2022-07-13 RX ADMIN — LIDOCAINE HYDROCHLORIDE 100 MG: 20 INJECTION, SOLUTION INFILTRATION; PERINEURAL at 12:49

## 2022-07-13 RX ADMIN — FENTANYL CITRATE 25 MCG: 50 INJECTION, SOLUTION INTRAMUSCULAR; INTRAVENOUS at 18:03

## 2022-07-13 RX ADMIN — OXYCODONE HYDROCHLORIDE 5 MG: 5 TABLET ORAL at 17:46

## 2022-07-13 RX ADMIN — PHENYLEPHRINE HYDROCHLORIDE 100 MCG: 10 INJECTION INTRAVENOUS at 15:38

## 2022-07-13 RX ADMIN — PROCHLORPERAZINE EDISYLATE 5 MG: 5 INJECTION INTRAMUSCULAR; INTRAVENOUS at 19:37

## 2022-07-13 RX ADMIN — HYDROMORPHONE HYDROCHLORIDE 0.5 MG: 1 INJECTION, SOLUTION INTRAMUSCULAR; INTRAVENOUS; SUBCUTANEOUS at 13:43

## 2022-07-13 RX ADMIN — Medication: at 16:30

## 2022-07-13 RX ADMIN — FENTANYL CITRATE 25 MCG: 50 INJECTION, SOLUTION INTRAMUSCULAR; INTRAVENOUS at 17:53

## 2022-07-13 RX ADMIN — HYDROMORPHONE HYDROCHLORIDE 0.5 MG: 1 INJECTION, SOLUTION INTRAMUSCULAR; INTRAVENOUS; SUBCUTANEOUS at 15:28

## 2022-07-13 RX ADMIN — MIDAZOLAM 2 MG: 1 INJECTION INTRAMUSCULAR; INTRAVENOUS at 12:38

## 2022-07-13 RX ADMIN — Medication 50 MG: at 12:51

## 2022-07-13 RX ADMIN — DEXMEDETOMIDINE 12 MCG: 100 INJECTION, SOLUTION, CONCENTRATE INTRAVENOUS at 13:37

## 2022-07-13 RX ADMIN — SUGAMMADEX 200 MG: 100 INJECTION, SOLUTION INTRAVENOUS at 16:50

## 2022-07-13 RX ADMIN — PROPOFOL 50 MCG/KG/MIN: 10 INJECTION, EMULSION INTRAVENOUS at 12:58

## 2022-07-13 RX ADMIN — PHENYLEPHRINE HYDROCHLORIDE 100 MCG: 10 INJECTION INTRAVENOUS at 15:39

## 2022-07-13 RX ADMIN — Medication 20 MG: at 13:37

## 2022-07-13 RX ADMIN — Medication 10 MG: at 15:33

## 2022-07-13 RX ADMIN — SODIUM CHLORIDE, POTASSIUM CHLORIDE, SODIUM LACTATE AND CALCIUM CHLORIDE: 600; 310; 30; 20 INJECTION, SOLUTION INTRAVENOUS at 12:42

## 2022-07-13 NOTE — ANESTHESIA PROCEDURE NOTES
Airway       Patient location during procedure: OR       Procedure Start/Stop Times: 7/13/2022 12:54 PM  Staff -        Other Anesthesia Staff: Ninfa Marin       Performed By: SRNA  Consent for Airway        Urgency: elective  Indications and Patient Condition       Indications for airway management: arielle-procedural       Induction type:intravenous       Mask difficulty assessment: 1 - vent by mask    Final Airway Details       Final airway type: endotracheal airway       Successful airway: ETT - single and Oral  Endotracheal Airway Details        ETT size (mm): 7.0       Cuffed: yes       Successful intubation technique: direct laryngoscopy       DL Blade Type: MAC 3       Grade View of Cords: 2       Position: Right       Measured from: gums/teeth       Secured at (cm): 21       Bite block used: None    Post intubation assessment        Placement verified by: capnometry, equal breath sounds and chest rise        Number of attempts at approach: 1       Number of other approaches attempted: 0       Secured with: silk tape       Ease of procedure: easy       Dentition: Intact and Unchanged    Medication(s) Administered   Medication Administration Time: 7/13/2022 12:54 PM

## 2022-07-13 NOTE — DISCHARGE INSTRUCTIONS
Same-Day Surgery   Adult Discharge Orders & Instructions     For 24 hours after surgery:  Get plenty of rest.  A responsible adult must stay with you for at least 24 hours after you leave the hospital.   Pain medication can slow your reflexes. Do not drive or use heavy equipment.  If you have weakness or tingling, don't drive or use heavy equipment until this feeling goes away.  Mixing alcohol and pain medication can cause dizziness and slow your breathing. It can even be fatal. Do not drink alcohol while taking pain medication.  Avoid strenuous or risky activities.  Ask for help when climbing stairs.   You may feel lightheaded.  If so, sit for a few minutes before standing.  Have someone help you get up.   If you have nausea (feel sick to your stomach), drink only clear liquids such as apple juice, ginger ale, broth or 7-Up.  Rest may also help.  Be sure to drink enough fluids.  Move to a regular diet as you feel able. Take pain medications with a small amount of solid food, such as toast or crackers, to avoid nausea.   A slight fever is normal. Call the doctor if your fever is over 100 F (37.7 C) (taken under the tongue) or lasts longer than 24 hours.  You may have a dry mouth, muscle aches, trouble sleeping or a sore throat.  These symptoms should go away after 24 hours.  Do not make important or legal decisions.   Pain Management:      1. Take pain medication (if prescribed) for pain as directed by your physician.        2. WARNING: If the pain medication you have been prescribed contains Tylenol  (acetaminophen), DO NOT take additional doses of Tylenol (acetaminophen).     Call your doctor for any of the followin.  Signs of infection (fever, growing tenderness at the surgery site, severe pain, a large amount of drainage or bleeding, foul-smelling drainage, redness, swelling).    2.  It has been over 8 to 10 hours since surgery and you are still not able to urinate (pee).    3.  Headache for over 24  hours.    4.  Numbness, tingling or weakness the day after surgery (if you had spinal anesthesia).  To contact a doctor, call Erasmo Millan's office at [phone] at the [facility] from 8 am till 5 pm  or:  '   [phone] and ask for the Resident On Call for: Plastic Surgery (answered 24 hours a day)  '   Emergency Department:  [facility]: [phone]  [facility]: [phone]    ON-Q  Continuous Pain Pump Discharge Instructions after Bilateral Mastectomy with Dr. Willis    The OnQ pump:     Dr. Willis inserted a pain pump under your incisions.  The pump is shaped like a balloon and is filled with medicine that causes numbness or loss of sensation to help control your pain around the incision.  The pain pump DOES NOT contain controlled substances.    The medicine in the pump may alter your ability to feel changes in temperature or pressure.       The Pump:    The pump delivers medicine at a very slow rate.  You will NOT see the medicine moving through the tubing.  As the medicine is delivered, the pump ball will slowly become smaller.  It may take a day or so before you notice a change in the size and look of the pump.  It typically takes 5 days for all the medicine in the pump to deliver.  The middle part of the pump may look like an apple core when empty.    Managing Your Pain:    The Medicine and Infusion Rate: 0.2% Ropivacaine at 4mL/ hr     The pain pump may not block all of the pain from your surgery so it is important that you take the pain medicines prescribed by your surgeon if you need them.    If you continue to have difficulty with your pain control, please contact Dr. Willis's clinic.    Caring for Your Pump at Home:    The pump functions when the blue sensor tubing in contact with your skin. Your skin temperature keeps the valve open. Currently it is adhered with a clear dressing. Please reinforce this dressing as  needed to ensure pump function.  Make sure there are no kinks in the tubing.  Do not tape or cover up the filter.  Protect the pump from sunlight and heat.  When sleeping:   Do not place the pump underneath the bed covers where the pump may become too warm.  Do not place the pump on the floor or hang the pump on a bed post as these situations may cause the tubing to get tangled and get pulled out.  Bathing/Showering:    We recommend taking sponge baths until the pump is removed.  It is important to not get the area where the tube enters your body wet.    Removing the Tubing:    Dr. Willis will take the pain pump tubes out at your follow up appointment. If you wish to remove the tubes yourself, follow these steps:    Wash your hands.  Remove the clear dressing that covers the tubing.  Grasp the tubing close to the skin and gently pull.  If you meet resistance, stop pulling and call Dr. Willis's clinic  DO NOT cut or forcefully remove the tubing.  After removal, check the end of the tubing for a dark tip.  If you do not see a dark tip, call Dr. Willis's clinic.  Apply firm pressure over the site until oozing stops.  Wash the area with soap and water, dry with a clean towel and then cover with a bandage.    The pump is not reusable. Dispose of it in the trash and wash your hands.     Troubleshooting:    Tubing Comes Out From Skin:  If the tube accidentally comes out, check the end of the tube for a dark tip.  If you see a dark tip simply discard it and do not attempt to reinsert the tube. You will also have to remove the tube on the other side.  If you don t see a dark tip, immediately call Dr. Willis's clinic.    Tubing Disconnection:  If the tubing accidentally becomes disconnected from the pump, DO NOT reconnect the pump to the tubing.  It may have been contaminated with germs.  Remove the tubes as described above and call Dr. Willis's clinic.    Fluid Leaking:  If enough fluid is leaking from the pump or the  tubing outside your body to soak through the dressing, please contact Dr. Willis's clinic.    Immediately report the following to Dr. Willis's clinic:    Redness, warmth, swelling, or tenderness at the site the tubing was inserted  Increase in pain  Fever, chills, sweats  Bowel or bladder changes  Difficulty breathing  Dizziness, lightheadedness  Blurred vision  Ringing or buzzing in your ears  Metal taste in your mouth  Numbness and/or tingling around your mouth, fingers or toes  Drowsiness  Confusion  Trouble removing the tubing  Dark tip is not present when tubing is removed       During daytime hours call Dr. Willis's plastic surgery clinic RN at [phone] or main office at [phone]     After hours and weekends: [phone] and ask for the Resident On Call for Plastic Surgeon       Caring for your Germán-Conteh Drains after Bilateral Mastectomy with Dr. Lazaro Willis placed Germán-Conteh drainage tubes into your incisions. This tube drains fluid from your incision, helping prevent swelling and reducing the risk for infection. The tube is held in place by a few stitches. The drain will be removed at a follow up appointment when the amount of drainage decreases.     Home Care:  Make sure the tube does not pull. You may tape the tube to the skin below the bandage.  Secure the tube and bulb inside your clothing with a safety pin. This helps keep the tube from being pulled out.   Keep the bulb compressed at all times, except when you empty it.  Empty your drain at least twice a day. Empty it more often if the drain is full.   Wash your hands  Lift the opening of the drain.  Drain the fluid into a measuring cup.  Record the amount of fluid each time you empty in the chart below. Share the information with your doctor at your follow-up visit.   Squeeze the bulb with your hands until you hear air coming out of the bulb.  Close the opening.      Stripping  the tube helps keep blood clots from blocking  the tube. Do this twice a day when you empty the drain:    Hold the tubing where it leaves the skin with one hand. This keeps it from pulling on the skin.  Pinch the tubing with the thumb and first finger of your other hand.   Slowly and firmly pull your thumb and first finger down the tube (squeezing the tube between your fingers). Keep squeezing the tube as you run your fingers towards the bulb. If the pulling hurts or feels like it is coming out of the skin, STOP. Begin again more gently.  Let go of the tubing with both hands. If the tube is still blocked, repeat these steps three or four times. Make sure that the bulb is compressed so it creates suction.    When to call your doctor:  New or increased pain around the tube  Redness, warmth, or swelling around the incision or tube  Drainage that is foul smelling  Fever over 101 F degrees  Fluid leaking around the tube  Bulb won't hold suction  The tube falls out  A sudden amount of bright red drainage filling the bulb rapidly  A sudden decrease of fluid in bulb AND swelling with discomfort building up at site    Your drainage record:    Date Time Bulb 1: Amount of drainage (ml or cc) Bulb 2: Amount of drainage (ml or cc) Notes

## 2022-07-13 NOTE — ANESTHESIA CARE TRANSFER NOTE
Patient: Yudelka Salas    Procedure: Procedure(s):  Bilateral simple mastectomy, . OnQ       Diagnosis: Gender dysphoria in adolescent and adult [F64.0]  Diagnosis Additional Information: No value filed.    Anesthesia Type:   General     Note:    Oropharynx: oral airway in place, oropharynx clear of all foreign objects and spontaneously breathing  Level of Consciousness: drowsy  Oxygen Supplementation: face mask  Level of Supplemental Oxygen (L/min / FiO2): 10  Independent Airway: airway patency satisfactory and stable  Dentition: dentition unchanged  Vital Signs Stable: post-procedure vital signs reviewed and stable  Report to RN Given: handoff report given  Patient transferred to: PACU    Handoff Report: Identifed the Patient, Identified the Reponsible Provider, Reviewed the pertinent medical history, Discussed the surgical course, Reviewed Intra-OP anesthesia mangement and issues during anesthesia, Set expectations for post-procedure period and Allowed opportunity for questions and acknowledgement of understanding      Vitals:  Vitals Value Taken Time   /52    Temp 36.5    Pulse 94    Resp 18    SpO2 98 % 07/13/22 1707   Vitals shown include unvalidated device data.    Electronically Signed By: ISHA Zaldivar CRNA  July 13, 2022  5:08 PM

## 2022-07-13 NOTE — BRIEF OP NOTE
[facility] Brief Operative Note    Pre-operative diagnosis: Gender dysphoria in adolescent and adult [F64.0]   Post-operative diagnosis * No post-op diagnosis entered *  Same as above   Procedure: Procedure(s):  Bilateral simple mastectomy, . OnQ   Surgeon(s): Surgeon(s) and Role:     * Kim Willis MD - Primary     * Akilah Gordon PA-C - Resident - Assisting   Estimated blood loss: 50 mL   Fluids 2000ml IVF   Specimens: ID Type Source Tests Collected by Time Destination   1 : Breast, Left Mastectomy, Simple Breast, Left SURGICAL PATHOLOGY EXAM Kim Willis MD 7/13/2022  4:20 PM    2 : Breast, Right Mastectomy, Simple Breast, Right SURGICAL PATHOLOGY EXAM Kim Willis MD 7/13/2022  4:20 PM       Findings: Bilateral mastectomy through lower IMF incision, nipples intact and viable at end of case. dayna x2.

## 2022-07-13 NOTE — ANESTHESIA PREPROCEDURE EVALUATION
Anesthesia Pre-Procedure Evaluation    Patient: Yudelka Salas   MRN: [id number] : 1985        Procedure : Procedure(s):  Bilateral simple mastectomy, possible nipple grafts. OnQ          Past Medical History:   Diagnosis Date     History of appendectomy age 15    ruptured/abscess, on birthday over gerry      Past Surgical History:   Procedure Laterality Date     ZZC APPENDECTOMY,RUPT APPENDX+ABSCESS  age 15     on birthday, over        No Known Allergies   Social History     Tobacco Use     Smoking status: Never Smoker     Smokeless tobacco: Never Used   Substance Use Topics     Alcohol use: Yes     Alcohol/week: 3.3 standard drinks     Types: 4 drink(s) per week      Wt Readings from Last 1 Encounters:   22 55.4 kg (122 lb 2.2 oz)           Physical Exam    Airway        Mallampati: II   TM distance: > 3 FB   Neck ROM: full   Mouth opening: > 3 cm    Respiratory Devices and Support         Dental  no notable dental history         Cardiovascular   cardiovascular exam normal          Pulmonary   pulmonary exam normal                OUTSIDE LABS:  CBC: No results found for: WBC, HGB, HCT, PLT  BMP:   Lab Results   Component Value Date    [facility] 97 2022     COAGS: No results found for: PTT, INR, FIBR  POC: No results found for: BGM, HCG, HCGS  HEPATIC: No results found for: ALBUMIN, PROTTOTAL, ALT, AST, GGT, ALKPHOS, BILITOTAL, BILIDIRECT, EDEN  OTHER: No results found for: PH, LACT, A1C, NICOLE, PHOS, MAG, LIPASE, AMYLASE, TSH, T4, T3, CRP, SED    Anesthesia Plan    ASA Status:  2      Anesthesia Type: General.     - Airway: ETT   Induction: Propofol.   Maintenance: Balanced.        Consents    Anesthesia Plan(s) and associated risks, benefits, and realistic alternatives discussed. Questions answered and patient/representative(s) expressed understanding.    - Discussed:     - Discussed with:  Patient         Postoperative Care            Comments:                Bipin Martino  DO

## 2022-07-14 NOTE — OP NOTE
"Procedure Date: 07/13/2022    ATTENDING:  Kim Willis MD    FIRST ASSISTANT:  Akilah Gordon PA-C (no resident available, ROSALIE did 50% of case).    PREOPERATIVE DIAGNOSIS:  Gender dysphoria.    POSTOPERATIVE DIAGNOSIS:  Gender dysphoria.    PROCEDURE PERFORMED:  Bilateral simple mastectomies, on-Q catheter placement.    ANESTHESIA:  GET.    ESTIMATED BLOOD LOSS:  50 mL    INTRAVENOUS FLUIDS:  2000 mL    URINE OUTPUT:  1200 mL    COUNTS:  Correct.    COMPLICATIONS:  None.    DRAINS:  None.    DRAINS:  KISHORE x2.    SPECIMENS:  Right breast 87 grams, left breast 101 grams.    INDICATIONS FOR PROCEDURE:  This is a 36-year-old biological female who has a diagnosis of gender dysphoria.  They met WPATH criteria as well as insurance criteria for gender-affirming top surgery.  Of note, this patient does not have very large breast volume, but breasts are significantly descended on their thorax.  Their nipples are asymmetrical in position and higher on the left side, with the right being at the level of the pectoralis major muscle origin.      We had long discussions preoperatively on a number of occasions to discuss what they desired from the surgery.  They often swim shirtless and would love to have a flat chest contour.  They were hoping not to graft their nipples and wanted to retain both nipple sensation, and the periareolar hair.  They understand that their nipples are already asymmetrical on their chest and anatomically low compared to average.  We also discussed the possibility of removing the breast tissue with resultant \"shrink\" wrapping of the skin, causing further asymmetries or unpredictable outcome as far as the nipple position was concerned.  They felt willing to take that chance.  Originally, we had planned on periareolar approach, but they were okay using inframammary fold incisional approach.  This allowed us much better access for shaping the skin flaps and gaining desired contour for their anterior " chest.    DESCRIPTION OF PROCEDURE:  The patient was seen in the preoperative waiting area.  The operative sites were marked.  This included sternal notch, sternal midline, inframammary folds with lateral and medial border of the foot plate marked.  The patient is quite thin, so there is no extension laterally for dog ears.  We did some extra markings with regard to the palpable level of the inferior pec muscle origin on flexion.  We also marked the differences between the nipple areolar complexes from one side to the other.  We also marked the mid humerus transposed transfers on to the breast, chest area.  We then reviewed the possible risks and complications, including but not limited to the following:  Infection, bleeding, hematoma/seroma formation, poor healing, possible dehiscence, possible spitting sutures, possible hypertrophic scarring, possible partial or complete necrosis of the nipple areolar complex, possible irregularities of the superior skin flap, including areas of fat necrosis, possible residual deformities and asymmetries, possible need for further surgery, possible altered sensation of the chest wall either hypo or hypersensitivity, and possible anesthetic risks such as DVT, PE and cardiopulmonary arrest.  The patient understands that our approach to preserve their nipple areolar complex with the periareolar here and achieve a flat contour may not completely be to their liking.  If that is the case, we can discuss revisions at a later time.  The patient was then brought to the operating room and placed supine on the OR table.  After general anesthesia was administered, and the patient was oral endotracheally intubated, arms were placed on arm tables at about 85 degrees and secured with Ace wraps.  Padding was used appropriately for any IVs or monitors.  We did not place a Hernandez.  The thighs and forelegs were supported on pillows and secured with padded safety straps.  A lower Kojo Hugger was in  place.  The patient already had sequential compression devices on the lower extremities prior to induction.  The patient was then put into a sitting position on the OR table, and adjustments were made for symmetry.  The chest, breast area was then prepped and draped in the usual sterile fashion using ChloraPrep.  After timeout was taken and the proper patient and procedure were identified, we made our inframammary fold incisions with the Valleylab cautery and scalpel on the right and the PEAK PlasmaBlade on the left.  We did leave about 2 cm of subcutaneous fat before beveling down to the pectoral fascia.  The breast mound was elevated off the pectoral fascia superiorly towards the clavicle, medially towards parasternal border and lateral past the lateral border of the pectoralis major muscle.  Of note, this was somewhat difficult through our limited IMF incisions.  We did require the use of lighted retractors and Bovie extenders since all of the skin of the superior breast was retained including the nipple areolar complex.  Really the extent of undermining was much greater on the left where the nipple was higher in an effort to try and redraped things a bit lower.  We were really trying to get things as symmetrical as possible with the contralateral right nipple, which was much lower.  Once we had resected our breast tissue, and achieved a thin, flat contour.  Additional work was done to try and relieve other than a transition around the outer limits of the pocket.  The incisions were then skin stapled and the patient was put into a sitting position.  This showed an even greater difference between the nipple areolar complex placement.  Also, the right IMF was a different shape with downward slope and laterally.  We decided to elevate the lateral slip on the right to get a more anatomical shape from that position since we are already several centimeters below the pec origin.  In order to match that, a lower  location, we trimmed both above and below along the incision on the left side.  With this again stapled and in the sitting position, we were achieving a closer match with regard to the NAC position.  Also, we were shooting for closer contour of the IMF incision.  Once we were happy with our near symmetry, our dissection pockets were irrigated with Ancef saline solution.  Hemostasis was aggressively obtained using the cautery on both sides.  A #15 round channel drain was used and introduced through a separate stab wound incision laterally and secured with 3-0 nylon suture.  This was trimmed and put along the bottom of the pocket, 7.5-inch On-Q catheters were percutaneously introduced from the epigastric region draped along the superior pocket.  These were secured with Mastisol and Tegaderm.  Definitive closure was then achieved with 3-0 Vicryl deep dermal buried sutures followed by 4-0 Vicryl running subcuticular suture and a couple 5-0 fast absorbing guts for touchups.  We did not choose to use 2-0s to pull tissues together since that would change the levels of our incisions and our NACs.  Our hope is that things will shrink wraps and inferiorly, similar fashion with nipple position being more symmetrical.  Dressings of ABDs were used for drain sponges and a couple Kerlix rolls were unfurled across the anterior chest for padding before wrapping the thorax circumferentially with a double long 6-inch Ace wrap for compression.  The patient was straight catheterized prior to extubation.  We measured approximately 1200 mL of urine output.  This was left in for them to go to the PACU.  The patient was extubated and transferred to a stretcher and taken to the recovery room in satisfactory condition having tolerated the procedure without difficulty or complication.  Just prior to leaving the OR, the On-Q catheters were attached to the reservoir.  This contained 550 mL of 0.2% ropivacaine to be delivered at 2 mL per hour per  catheter for the next 5 days.    FINAL WEIGHTS FOR THE TISSUE REMOVED:  87 grams on the right, 101 grams on the left.  This tissue was then sent to Pathology to be placed in formalin for histologic exam.  This is very pleased piecemeal far as the presentation.    Kim Willis MD        D: 2022   T: 2022   MT: BORIS    Name:     JUAN MIGUEL BARKSDALE  MRN:      [id number]        Account:        [id number]   :      1985           Procedure Date: 2022     Document: [id number]

## 2022-07-19 ENCOUNTER — OFFICE VISIT (OUTPATIENT)
Dept: PLASTIC SURGERY | Facility: CLINIC | Age: 37
End: 2022-07-19

## 2022-07-19 DIAGNOSIS — F64.0 GENDER DYSPHORIA IN ADOLESCENT AND ADULT: Primary | ICD-10-CM

## 2022-07-19 PROCEDURE — 99024 POSTOP FOLLOW-UP VISIT: CPT

## 2022-07-19 NOTE — PATIENT INSTRUCTIONS
Care of Chest Incisions     Keep compression on for 1 more week from today. Continue modified T-Akbar arms for 2 more weeks. At 3 weeks post op you may begin to use your arms as you normally would. Remove the tape from your incisions by 3 weeks post op.You may start moisturizing your incisions with any non-scented, non-glittered lotion 3 weeks from your surgery date. You can start scar care after the tape is removed. Any over the counter scar care is ok, we recommend silicone strips or sheets. These can be purchased on [employer] and at [facility].     At one month post op, baseline shoulder motion should return. Full shoulder motion should return by 8 weeks.      When to call:  Sudden increase of swelling or pain on one side  Uncontrolled pain despite pain medication  Worsening of chest swelling   Separation of nipple from chest skin  Redness and warmth in chest area  Fever > 101     Contact the RN between 8-3:30 Mon-Fri with questions or concerns through my chart message via your doctor's name (most efficient) or call at [phone].   For urgent medical issues that cannot wait, call [phone] Mon-Fri 8:-4:30.     After hours, weekends or holidays, call [phone] and ask to speak to the on call plastic surgeon fellow.

## 2022-07-19 NOTE — LETTER
7/19/2022       RE: Yudelka Salas  [address]  [facility] [address]     Dear Colleague,    Thank you for referring your patient, Yudelka Salas, to the [facility] [place] [facility]. Please see a copy of my visit note below.    Pt. comes into clinic today at the request of Dr. Kim Willis.    This service provided today was under the supervising provider of the day Dr Willis, who was available if needed.    Reason for visit: Post op visit.  Pt returns one week after bilateral mastectomy.    Incisions:  Well approximated, no drainage, no redness  Pain:  Denies- using Tylenol occasionally. On-Q pump removed.   Drains: Bilateral KISHORE drains < 30 ml for several days.  Both removed.  Instructions:  See AVS  Return to clinic:  See Dr. Willis in 5 weeks      Julian Harris, RN, BSN  Care Coordinator

## 2022-07-20 LAB
PATH REPORT.COMMENTS IMP SPEC: NORMAL
PATH REPORT.COMMENTS IMP SPEC: NORMAL
PATH REPORT.FINAL DX SPEC: NORMAL
PATH REPORT.GROSS SPEC: NORMAL
PATH REPORT.MICROSCOPIC SPEC OTHER STN: NORMAL
PATH REPORT.RELEVANT HX SPEC: NORMAL
PHOTO IMAGE: NORMAL

## 2022-07-20 PROCEDURE — 88305 TISSUE EXAM BY PATHOLOGIST: CPT | Mod: 26 | Performed by: PATHOLOGY

## 2022-07-20 NOTE — PROGRESS NOTES
Pt. comes into clinic today at the request of Dr. Kim Willis.    This service provided today was under the supervising provider of the day Dr Willis, who was available if needed.    Reason for visit: Post op visit.  Pt returns one week after bilateral mastectomy.    Incisions:  Well approximated, no drainage, no redness  Pain:  Denies- using Tylenol occasionally. On-Q pump removed.   Drains: Bilateral KISHORE drains < 30 ml for several days.  Both removed.  Instructions:  See AVS  Return to clinic:  See Dr. Willis in 5 weeks    Julian Harris, RN, BSN  Care Coordinator

## 2022-08-23 ENCOUNTER — OFFICE VISIT (OUTPATIENT)
Dept: PLASTIC SURGERY | Facility: CLINIC | Age: 37
End: 2022-08-23
Payer: COMMERCIAL

## 2022-08-23 VITALS
SYSTOLIC BLOOD PRESSURE: 114 MMHG | TEMPERATURE: 98.6 F | DIASTOLIC BLOOD PRESSURE: 74 MMHG | HEIGHT: 63 IN | BODY MASS INDEX: 22.68 KG/M2 | WEIGHT: 128 LBS | HEART RATE: 67 BPM | OXYGEN SATURATION: 100 %

## 2022-08-23 DIAGNOSIS — Z90.13 S/P BILATERAL MASTECTOMY: Primary | ICD-10-CM

## 2022-08-23 DIAGNOSIS — F64.0 GENDER DYSPHORIA IN ADOLESCENT AND ADULT: ICD-10-CM

## 2022-08-23 PROCEDURE — 99024 POSTOP FOLLOW-UP VISIT: CPT | Performed by: SURGERY

## 2022-08-23 ASSESSMENT — PAIN SCALES - GENERAL: PAINLEVEL: NO PAIN (0)

## 2022-08-23 NOTE — LETTER
"8/23/2022       RE: Yudelka Salas  [address]  [facility] [address]     Dear Colleague,    Thank you for referring your patient, Yudelka Salas, to the [facility] PLASTIC AND RECONSTRUCTIVE SURGERY CLINIC [place] at [facility]. Please see a copy of my visit note below.    PLASTICS POST-OP   This is a 36 year old trans male with a history of gender dysphoria who presents 6 weeks post-op after a nipple-sparing bilateral simple mastectomy reconstruction on 7/13/2022.     PE: Chest:   Good upper chest contour.  Has slight fullness of mound on L, more fullness on R.    Preserved NACs with more ptosis of R nipple, only slight on L. R NAC still lower than L as was preop.   Incisions do not touch at midline. Nicely hidden by residual breast ptosis.   Scars appear well-healed.   Right side displays slightly more ptosis than left.    Photos taken with consent.    A&P: 36 year old trans male (he/him) who presents 6 weeks post-op after a bilateral simple mastectomy with nipple grafts on 7/13/2022. Cristian states that the pain was worst at the drain sites, they noted that for the first week they could not sit up and had some muscle aches. They did not use the narcotics at al since opioids cause N/V. OnQ was helpful.  They do think at some point they would like to do a revision in order to be flatter. He reports that he is \"low-key frustrated with [himself]\" because he didn't know that he would prefer a flatter outcome, and states he is now having flashbacks to being a 15-year-old. He is not concerned with nipple placement. He is willing to do nipple grafts now in order to achieve the result he now knows he would like, which would include nipple grafts without hair.     I reviewed with the patient how long to maintain limited activities. We discussed scar reducing techniques, such as Mederma, silicone strips, vitamin E oil, emu oil, massage and when he may " begin using these. Problems to look out for during the recovery period were discussed, including: spitting sutures, incision dehiscence, hematoma/seroma, thick scarring, fluid accumulation under skin flap, delayed nipple graft healing.    He will follow up 3-6 months post-op to discuss revisions. He is aware that he will need a second letter from his therapist. He would be interested in scheduling surgery for next summer given the amount of recovery time and his work in the public school systems. Causes for returning sooner were discussed.     Total time =15 minutes, spent on the date of encounter doing chart review, history and physical, dressing changes, documentation, patient education, and any further activity as noted above.     This note was prepared on behalf of Kim Willis MD by Kaylee Rivera, a trained medical scribe, based on my observations and the provider's statements to me.         Sincerely,    Kim Willis MD

## 2022-08-23 NOTE — PROGRESS NOTES
"PLASTICS POST-OP   This is a 36 year old trans male with a history of gender dysphoria who presents 6 weeks post-op after a nipple-sparing bilateral simple mastectomy reconstruction on 7/13/2022.     PE: Chest:   Good upper chest contour.  Has slight fullness of mound on L, more fullness on R.    Preserved NACs with more ptosis of R nipple, only slight on L. R NAC still lower than L as was preop.   Incisions do not touch at midline. Nicely hidden by residual breast ptosis.   Scars appear well-healed.   Right side displays slightly more ptosis than left.    Photos taken with consent.    A&P: 36 year old trans male (he/him) who presents 6 weeks post-op after a bilateral simple mastectomy with nipple grafts on 7/13/2022. Cristian states that the pain was worst at the drain sites, they noted that for the first week they could not sit up and had some muscle aches. They did not use the narcotics at al since opioids cause N/V. OnQ was helpful.  They do think at some point they would like to do a revision in order to be flatter. He reports that he is \"low-key frustrated with [himself]\" because he didn't know that he would prefer a flatter outcome, and states he is now having flashbacks to being a 15-year-old. He is not concerned with nipple placement. He is willing to do nipple grafts now in order to achieve the result he now knows he would like, which would include nipple grafts without hair.     I reviewed with the patient how long to maintain limited activities. We discussed scar reducing techniques, such as Mederma, silicone strips, vitamin E oil, emu oil, massage and when he may begin using these. Problems to look out for during the recovery period were discussed, including: spitting sutures, incision dehiscence, hematoma/seroma, thick scarring, fluid accumulation under skin flap, delayed nipple graft healing.    He will follow up 3-6 months post-op to discuss revisions. He is aware that he will need a second letter from " his therapist. He would be interested in scheduling surgery for next summer given the amount of recovery time and his work in the public school systems. Causes for returning sooner were discussed.     Total time =15 minutes, spent on the date of encounter doing chart review, history and physical, dressing changes, documentation, patient education, and any further activity as noted above.     This note was prepared on behalf of Kim Willis MD by Kaylee Rivera, a trained medical scribe, based on my observations and the provider's statements to me.

## 2022-08-23 NOTE — NURSING NOTE
"Chief Complaint   Patient presents with     RECHMICAH     Cristian, is being seen today for a 6 week post-op DOS 7/13/22, revision discussion, as reported by patient.       Vitals:    08/23/22 1007   BP: 114/74   BP Location: Left arm   Patient Position: Chair   Cuff Size: Adult Regular   Pulse: 67   Temp: 98.6  F (37  C)   TempSrc: Oral   SpO2: 100%   Weight: 58.1 kg (128 lb)   Height: 1.6 m (5' 3\")       Body mass index is 22.67 kg/m .      Maria Solomon LPN    "

## 2022-09-03 ENCOUNTER — HEALTH MAINTENANCE LETTER (OUTPATIENT)
Age: 37
End: 2022-09-03

## 2022-12-27 ENCOUNTER — OFFICE VISIT (OUTPATIENT)
Dept: PLASTIC SURGERY | Facility: CLINIC | Age: 37
End: 2022-12-27
Payer: COMMERCIAL

## 2022-12-27 VITALS
HEART RATE: 68 BPM | OXYGEN SATURATION: 99 % | BODY MASS INDEX: 23.57 KG/M2 | SYSTOLIC BLOOD PRESSURE: 113 MMHG | HEIGHT: 63 IN | WEIGHT: 133 LBS | DIASTOLIC BLOOD PRESSURE: 79 MMHG | TEMPERATURE: 98 F

## 2022-12-27 DIAGNOSIS — F64.0 GENDER DYSPHORIA IN ADULT: Primary | ICD-10-CM

## 2022-12-27 PROCEDURE — 99214 OFFICE O/P EST MOD 30 MIN: CPT | Performed by: SURGERY

## 2022-12-27 RX ORDER — CITALOPRAM HYDROBROMIDE 10 MG/1
TABLET ORAL
COMMUNITY
Start: 2022-11-22

## 2022-12-27 ASSESSMENT — PAIN SCALES - GENERAL: PAINLEVEL: NO PAIN (0)

## 2022-12-27 NOTE — NURSING NOTE
"Chief Complaint   Patient presents with     RECHECK     Cristian, is being seen today for a 3-6 month f/up discussion revision.       Vitals:    12/27/22 1152   BP: 113/79   BP Location: Left arm   Patient Position: Chair   Cuff Size: Adult Regular   Pulse: 68   Temp: 98  F (36.7  C)   TempSrc: Oral   SpO2: 99%   Weight: 60.3 kg (133 lb)   Height: 1.6 m (5' 3\")       Body mass index is 23.56 kg/m .      Maria Solomon LPN    "

## 2022-12-27 NOTE — LETTER
"12/27/2022       RE: Yudelka Salas  [address]  [facility] [address]         Dear Colleague,    Thank you for referring your patient, Yudelka Salas, to the [facility] PLASTIC AND RECONSTRUCTIVE SURGERY CLINIC [place] at [facility]. Please see a copy of my visit note below.    TOP SURGERY FOLLOW UP    Cristian returns for further followup after their top surgery 7/13/22. We had tried to preserve their nipples and periareolar hair knowing that this was probably not the most ideal approach to provide a fully masculine appearance, but asymmetries in flap thickness/contour, nipple level and incision level continue to cause dysphoria for them.     After examining Cristian again, there is still some skin laxity of the superior skin flap that could allow for a revision to achieve a flatter more masculine contour. However, this may result in an incision higher than their inferior pectoralis muscle origin, and might also leave some periareolar hair along their scar. This may require future electrolysis. Cristian is also interested in nipple grafts.    They apparently have a letter of support from their therapist that they will upload through [employer] so that we can move forward with scheduling and see if we can get insurance prior approval for a \"completion\" mastectomy, top surgery revision with nipple grafts.     Cristian has given considerable thought to what will most help their dysphoria and understands the possible risks, complications and limitations of our proposed procedure.     Total time spent on re-examining and developing a new care plan, as well as documentation  = 30 minutes.             Again, thank you for allowing me to participate in the care of your patient.      Sincerely,    Kim Willis MD      "

## 2023-01-09 ENCOUNTER — DOCUMENTATION ONLY (OUTPATIENT)
Dept: PLASTIC SURGERY | Facility: CLINIC | Age: 38
End: 2023-01-09

## 2023-01-09 NOTE — PROGRESS NOTES
[facility] :  Care Coordination Note     SITUATION   Cristian Salas is a 37 year old adult who is receiving support for:  Care Team  .    BACKGROUND     Reviewed [place]. It meets criteria. Pt indicated they are seeking double incision revision and nipple revision. They want to get nipple revision in clinic prior to full double incision procedure. This writer sent message to RNCC to coordinate setting up an appt in clinic.     ASSESSMENT     Surgery              CGC Assessment  [facility] [place]ment: Enrolled  Patient has a therapist: Yes  Name of therapist: Nick Bean  Letter of support #1: Received  Letter #1 Date: 12/16/22  Surgery being considered: Yes  Mastectomy: Yes          PLAN          Nursing Interventions:       Follow-up plan:  Surgeon to place case request for revision surgery, RNCC to follow up re: nipple reduction appointment if it's possible to do in-clinic. Then surgery to be scheduled.        MICHELLE RAMOS [facility]

## 2023-01-10 ENCOUNTER — TELEPHONE (OUTPATIENT)
Dept: PLASTIC SURGERY | Facility: CLINIC | Age: 38
End: 2023-01-10

## 2023-01-10 NOTE — TELEPHONE ENCOUNTER
Pt is pursuing a top surgery revision and had questions about whether a nipple revision could be completed in clinic. Dr Willis confirmed that a nipple revision would not happen in clinic. Called pt to discuss. Unable to reach, will send [employer] message.     Pt plans to have a top surgery revision with nipple grafts. Dr Willis to place orders and surgery to be scheduled.     Edmund Cruz RN

## 2023-01-11 ENCOUNTER — TELEPHONE (OUTPATIENT)
Dept: PLASTIC SURGERY | Facility: CLINIC | Age: 38
End: 2023-01-11

## 2023-02-28 ENCOUNTER — OFFICE VISIT (OUTPATIENT)
Dept: PLASTIC SURGERY | Facility: CLINIC | Age: 38
End: 2023-02-28
Payer: COMMERCIAL

## 2023-02-28 VITALS
HEIGHT: 63 IN | HEART RATE: 107 BPM | SYSTOLIC BLOOD PRESSURE: 131 MMHG | BODY MASS INDEX: 23.39 KG/M2 | WEIGHT: 132 LBS | TEMPERATURE: 99.3 F | DIASTOLIC BLOOD PRESSURE: 90 MMHG | OXYGEN SATURATION: 98 %

## 2023-02-28 DIAGNOSIS — F64.0 GENDER DYSPHORIA IN ADULT: Primary | ICD-10-CM

## 2023-02-28 PROCEDURE — 99215 OFFICE O/P EST HI 40 MIN: CPT | Performed by: SURGERY

## 2023-02-28 ASSESSMENT — PAIN SCALES - GENERAL: PAINLEVEL: NO PAIN (0)

## 2023-02-28 NOTE — LETTER
"2/28/2023       RE: Yudelka Salas  [address]  [facility] [address]     Dear Colleague,    Thank you for referring your patient, Yudelka Salas, to the [facility] PLASTIC AND RECONSTRUCTIVE SURGERY CLINIC [place] at [facility]. Please see a copy of my visit note below.    PLASTICS REVISION PRE-OP  This is a 37 year old trans adult (they/them). Cristian returns for further follow-up regarding potential revisions after their original nipple-sparing bilateral simple mastectomy reconstruction 7/13/22. They are here today by themself.    Patient states they are concerned about \"nipping out,\" which happens when they wear T-shirts. This is a source of ongoing dysphoria for Cristian. They are curious about whether the nipples should be re-grafted. They do have an updated letter of support from ALEXUS Major (Zeb), RUCHI, which has been uploaded to [employer]. Currently they report little to no sensation in the nipples. They are also fine if they lose the majority of their nipple projection. If possible they are interested in keeping the hair pattern surrounding the nipple areolar complex.    We discussed the possibility of a partial graft. This could yield a more symmetrical areola on the right matching the arc of the left nipple. It may also be an option to do a double-incision without nipple grafts with tattoos to be completed the following year.     We also discussed lifting the incision to a full double incision. This would be the most straightforward way to approach the procedure but would involve introducing  more tightness on the left over the nipple, and Cristian is comfortable with this. In fixing the R mound we discussed that they will likely also lose their existing symmetry. We may also be able to re-graft both their nipples but not hair.     Cristian also shared a couple photos from the Internet that showed transverse incisions through " "the NAC, as well as a limited vertical incision inferior to the NAC.     PE:  Good upper chest contour. Has slight fullness of mound on L, more fullness on R. Patient is fine with the L side other than the \"crease\" just below the central nipple itself.  While the incision and NAC are a bit low for his palpable inferior pectoralis margin on flexion, the overall appearance is not bad.   Nipples are prominent with fullness bilaterally  Significant hair around the NAC  Loose skin on right  Double \"fold/crease\" under the right nipple and areola.  Incisions beasley not touch at midline  Scars are thin with the R being somewhat more curved.    For work purposes, Cristian would like to schedule surgery in December 2023. We discussed that for best outcome they can build strength in their pectoral muscles and they are agreeable to this. Near the end of our conversation, they state they are most interested in double-incision mastectomy. We will plan for this and they will check in via [employer] between now and December.     Cristian has given considerable thought to what will most help their dysphoria and understands the possible risks, complications and limitations of our proposed procedure.    Total time = 45 minutes, spent on the date of encounter doing chart review, history and physical, dressing changes, documentation, patient education, and any further activity as noted above.     This note was prepared on behalf of Kim Willis MD by Kaylee Rivera (they/them), a trained medical scribe, based on my observations and the provider's statements to me.       Sincerely,    Kim Willis MD  "

## 2023-02-28 NOTE — PROGRESS NOTES
"PLASTICS REVISION PRE-OP  This is a 37 year old trans adult (they/them). Cristian returns for further follow-up regarding potential revisions after their original nipple-sparing bilateral simple mastectomy reconstruction 7/13/22. They are here today by themself.    Patient states they are concerned about \"nipping out,\" which happens when they wear T-shirts. This is a source of ongoing dysphoria for Cristian. They are curious about whether the nipples should be re-grafted. They do have an updated letter of support from ALEXUS Major (Zeb), LICROSALINDA, which has been uploaded to [employer]. Currently they report little to no sensation in the nipples. They are also fine if they lose the majority of their nipple projection. If possible they are interested in keeping the hair pattern surrounding the nipple areolar complex.    We discussed the possibility of a partial graft. This could yield a more symmetrical areola on the right matching the arc of the left nipple. It may also be an option to do a double-incision without nipple grafts with tattoos to be completed the following year.     We also discussed lifting the incision to a full double incision. This would be the most straightforward way to approach the procedure but would involve introducing  more tightness on the left over the nipple, and Cristian is comfortable with this. In fixing the R mound we discussed that they will likely also lose their existing symmetry. We may also be able to re-graft both their nipples but not hair.     Cristian also shared a couple photos from the Internet that showed transverse incisions through the NAC, as well as a limited vertical incision inferior to the NAC.     PE:  Good upper chest contour. Has slight fullness of mound on L, more fullness on R. Patient is fine with the L side other than the \"crease\" just below the central nipple itself.  While the incision and NAC are a bit low for his palpable inferior pectoralis margin on flexion, " "the overall appearance is not bad.   Nipples are prominent with fullness bilaterally  Significant hair around the NAC  Loose skin on right  Double \"fold/crease\" under the right nipple and areola.  Incisions beasley not touch at midline  Scars are thin with the R being somewhat more curved.    For work purposes, Cristian would like to schedule surgery in December 2023. We discussed that for best outcome they can build strength in their pectoral muscles and they are agreeable to this. Near the end of our conversation, they state they are most interested in double-incision mastectomy. We will plan for this and they will check in via [employer] between now and December.     Cristian has given considerable thought to what will most help their dysphoria and understands the possible risks, complications and limitations of our proposed procedure.    Total time = 45 minutes, spent on the date of encounter doing chart review, history and physical, dressing changes, documentation, patient education, and any further activity as noted above.     This note was prepared on behalf of Kim Willis MD by Kaylee Rivera (they/them), a trained medical scribe, based on my observations and the provider's statements to me.     "

## 2023-02-28 NOTE — NURSING NOTE
"Chief Complaint   Patient presents with     SAWYER Foster, is being seen today for a follow up consult to  discuss surgical options.       Vitals:    02/28/23 1554   BP: (!) 131/90   BP Location: Left arm   Patient Position: Chair   Cuff Size: Adult Regular   Pulse: 107   Temp: 99.3  F (37.4  C)   TempSrc: Oral   SpO2: 98%   Weight: 59.9 kg (132 lb)   Height: 1.6 m (5' 3\")       Body mass index is 23.38 kg/m .      Maria Solomon LPN    "

## 2023-04-19 NOTE — PROGRESS NOTES
"TOP SURGERY FOLLOW UP    Cristian returns for further followup after their top surgery 7/13/22. We had tried to preserve their nipples and periareolar hair knowing that this was probably not the most ideal approach to provide a fully masculine appearance, but asymmetries in flap thickness/contour, nipple level and incision level continue to cause dysphoria for them.     After examining Cristian again, there is still some skin laxity of the superior skin flap that could allow for a revision to achieve a flatter more masculine contour. However, this may result in an incision higher than their inferior pectoralis muscle origin, and might also leave some periareolar hair along their scar. This may require future electrolysis. Cristian is also interested in nipple grafts.    They apparently have a letter of support from their therapist that they will upload through [email or web address] so that we can move forward with scheduling and see if we can get insurance prior approval for a \"completion\" mastectomy, top surgery revision with nipple grafts.     Cristian has given considerable thought to what will most help their dysphoria and understands the possible risks, complications and limitations of our proposed procedure.     Total time spent on re-examining and developing a new care plan, as well as documentation  = 30 minutes.   " Abbe Flap (Upper To Lower Lip) Text: The defect of the lower lip was assessed and measured.  Given the location and size of the defect, an Abbe flap was deemed most appropriate.  Using a sterile surgical marker, an appropriate Abbe flap was measured and drawn on the upper lip. Local anesthesia was then infiltrated.  A scalpel was then used to incise the upper lip through and through the skin, vermilion, muscle and mucosa, leaving the flap pedicled on the opposite side.  The flap was then rotated and transferred to the lower lip defect.  The flap was then sutured into place with a three layer technique, closing the orbicularis oris muscle layer with subcutaneous buried sutures, followed by a mucosal layer and an epidermal layer.

## 2023-07-22 ENCOUNTER — HEALTH MAINTENANCE LETTER (OUTPATIENT)
Age: 38
End: 2023-07-22

## 2024-09-14 ENCOUNTER — HEALTH MAINTENANCE LETTER (OUTPATIENT)
Age: 39
End: 2024-09-14

## (undated) DEVICE — LABEL MEDICATION SYSTEM 3303-P

## (undated) DEVICE — DRAPE LAP TRANSVERSE 29421

## (undated) DEVICE — SOL NACL 0.9% IRRIG 1000ML BOTTLE 2F7124

## (undated) DEVICE — GLOVE PROTEXIS MICRO 6.5  2D73PM65

## (undated) DEVICE — PHOTON GUIDE INVUITY WIDE FLAT 104015

## (undated) DEVICE — STPL SKIN PROXIMATE 35 WIDE PMW35

## (undated) DEVICE — SU ETHILON 3-0 FS-1 18" 663G

## (undated) DEVICE — BNDG ELASTIC 6" DBL LENGTH UNSTERILE 6611-16

## (undated) DEVICE — SYR BULB IRRIG DOVER 60 ML LATEX FREE 67000

## (undated) DEVICE — ADH LIQUID MASTISOL TOPICAL VIAL 2-3ML 0523-48

## (undated) DEVICE — STRAP KNEE/BODY 31143004

## (undated) DEVICE — POSITIONER ARMBOARD FOAM 1PAIR LF FP-ARMB1

## (undated) DEVICE — BLADE KNIFE SURG 10 371110

## (undated) DEVICE — Device

## (undated) DEVICE — DRSG KERLIX 4 1/2"X4YDS ROLL 6730

## (undated) DEVICE — SPONGE LAP 18X18" X8435

## (undated) DEVICE — SU VICRYL 4-0 PS-1 18" UND J682G

## (undated) DEVICE — TUBING SUCTION MEDI-VAC 1/4"X20' N620A

## (undated) DEVICE — EYE MARKING PAD 581057

## (undated) DEVICE — DRAIN JACKSON PRATT RESERVOIR 100ML SU130-1305

## (undated) DEVICE — SU PLAIN 5-0 P-3 18" 686G

## (undated) DEVICE — ESU BLADE PEAK PLASMA 3.0S PS210-030S

## (undated) DEVICE — BLADE KNIFE SURG 15 371115

## (undated) DEVICE — ESU ELEC EDGE INSULATED BLADE 4" E1455-4

## (undated) DEVICE — GOWN XLG DISP 9545

## (undated) DEVICE — ESU GROUND PAD UNIVERSAL W/O CORD

## (undated) DEVICE — FILTER HEPA FLUID TRAP NEPTUNE 0703-040-001

## (undated) DEVICE — LINEN ORTHO PACK 5446

## (undated) DEVICE — SOL WATER IRRIG 1000ML BOTTLE 2F7114

## (undated) DEVICE — LINEN TOWEL PACK X5 5464

## (undated) DEVICE — SU VICRYL 3-0 FS-1 27" J442H

## (undated) DEVICE — PREP CHLORAPREP 26ML TINTED HI-LITE ORANGE 930815

## (undated) DEVICE — PAD CHUX UNDERPAD 30X36" P3036C

## (undated) DEVICE — DRSG TEGADERM 2 3/8X2 3/4" 1624W

## (undated) DEVICE — LIGHT HANDLE X2

## (undated) DEVICE — SUCTION MANIFOLD NEPTUNE 2 SYS 4 PORT 0702-020-000

## (undated) DEVICE — BNDG ELASTIC 6"X5YDS UNSTERILE 6611-60

## (undated) DEVICE — POSITIONER HEAD DONUT FOAM 9" LF FP-HEAD9

## (undated) DEVICE — DRSG ABDOMINAL 07 1/2X8" 7197D

## (undated) DEVICE — ESU PENCIL W/SMOKE EVAC NEPTUNE STRYKER 0703-046-000

## (undated) DEVICE — DRAIN JACKSON PRATT CHANNEL 15FR ROUND HUBLESS SIL JP-2228

## (undated) DEVICE — DERMABOND PRINEO 42CM

## (undated) RX ORDER — FENTANYL CITRATE 50 UG/ML
INJECTION, SOLUTION INTRAMUSCULAR; INTRAVENOUS
Status: DISPENSED
Start: 2022-07-13

## (undated) RX ORDER — FENTANYL CITRATE-0.9 % NACL/PF 10 MCG/ML
PLASTIC BAG, INJECTION (ML) INTRAVENOUS
Status: DISPENSED
Start: 2022-07-13

## (undated) RX ORDER — ACETAMINOPHEN 325 MG/1
TABLET ORAL
Status: DISPENSED
Start: 2022-07-13

## (undated) RX ORDER — SODIUM CHLORIDE, SODIUM LACTATE, POTASSIUM CHLORIDE, CALCIUM CHLORIDE 600; 310; 30; 20 MG/100ML; MG/100ML; MG/100ML; MG/100ML
INJECTION, SOLUTION INTRAVENOUS
Status: DISPENSED
Start: 2022-07-13

## (undated) RX ORDER — HYDROMORPHONE HYDROCHLORIDE 1 MG/ML
INJECTION, SOLUTION INTRAMUSCULAR; INTRAVENOUS; SUBCUTANEOUS
Status: DISPENSED
Start: 2022-07-13

## (undated) RX ORDER — BUPIVACAINE HYDROCHLORIDE 2.5 MG/ML
INJECTION, SOLUTION EPIDURAL; INFILTRATION; INTRACAUDAL
Status: DISPENSED
Start: 2022-07-13

## (undated) RX ORDER — CEFAZOLIN SODIUM 1 G/3ML
INJECTION, POWDER, FOR SOLUTION INTRAMUSCULAR; INTRAVENOUS
Status: DISPENSED
Start: 2022-07-13

## (undated) RX ORDER — ONDANSETRON 2 MG/ML
INJECTION INTRAMUSCULAR; INTRAVENOUS
Status: DISPENSED
Start: 2022-07-13

## (undated) RX ORDER — OXYCODONE HYDROCHLORIDE 5 MG/1
TABLET ORAL
Status: DISPENSED
Start: 2022-07-13

## (undated) RX ORDER — CEFAZOLIN SODIUM/WATER 2 G/20 ML
SYRINGE (ML) INTRAVENOUS
Status: DISPENSED
Start: 2022-07-13

## (undated) RX ORDER — PROPOFOL 10 MG/ML
INJECTION, EMULSION INTRAVENOUS
Status: DISPENSED
Start: 2022-07-13